# Patient Record
Sex: FEMALE
[De-identification: names, ages, dates, MRNs, and addresses within clinical notes are randomized per-mention and may not be internally consistent; named-entity substitution may affect disease eponyms.]

---

## 2024-06-03 ENCOUNTER — APPOINTMENT (OUTPATIENT)
Dept: ANTEPARTUM | Facility: CLINIC | Age: 24
End: 2024-06-03
Payer: COMMERCIAL

## 2024-06-03 ENCOUNTER — ASOB RESULT (OUTPATIENT)
Age: 24
End: 2024-06-03

## 2024-06-03 ENCOUNTER — LABORATORY RESULT (OUTPATIENT)
Age: 24
End: 2024-06-03

## 2024-06-03 PROBLEM — Z00.00 ENCOUNTER FOR PREVENTIVE HEALTH EXAMINATION: Status: ACTIVE | Noted: 2024-06-03

## 2024-06-03 PROCEDURE — 76813 OB US NUCHAL MEAS 1 GEST: CPT

## 2024-06-03 PROCEDURE — 36416 COLLJ CAPILLARY BLOOD SPEC: CPT

## 2024-06-03 PROCEDURE — 76801 OB US < 14 WKS SINGLE FETUS: CPT

## 2024-06-04 ENCOUNTER — APPOINTMENT (OUTPATIENT)
Dept: MATERNAL FETAL MEDICINE | Facility: CLINIC | Age: 24
End: 2024-06-04

## 2024-06-12 ENCOUNTER — ASOB RESULT (OUTPATIENT)
Age: 24
End: 2024-06-12

## 2024-06-12 ENCOUNTER — APPOINTMENT (OUTPATIENT)
Dept: MATERNAL FETAL MEDICINE | Facility: CLINIC | Age: 24
End: 2024-06-12
Payer: COMMERCIAL

## 2024-06-12 DIAGNOSIS — O24.919 UNSPECIFIED DIABETES MELLITUS IN PREGNANCY, UNSPECIFIED TRIMESTER: ICD-10-CM

## 2024-06-12 PROCEDURE — G0108 DIAB MANAGE TRN  PER INDIV: CPT | Mod: 95

## 2024-06-12 RX ORDER — BLOOD-GLUCOSE SENSOR
EACH MISCELLANEOUS
Qty: 2 | Refills: 3 | Status: ACTIVE | COMMUNITY
Start: 2024-06-12 | End: 1900-01-01

## 2024-06-19 ENCOUNTER — ASOB RESULT (OUTPATIENT)
Age: 24
End: 2024-06-19

## 2024-06-19 ENCOUNTER — TRANSCRIPTION ENCOUNTER (OUTPATIENT)
Age: 24
End: 2024-06-19

## 2024-06-19 ENCOUNTER — APPOINTMENT (OUTPATIENT)
Dept: MATERNAL FETAL MEDICINE | Facility: CLINIC | Age: 24
End: 2024-06-19
Payer: COMMERCIAL

## 2024-06-19 PROCEDURE — G0108 DIAB MANAGE TRN  PER INDIV: CPT | Mod: 95

## 2024-06-19 RX ORDER — PEN NEEDLE, DIABETIC 32GX 5/32"
32G X 4 MM NEEDLE, DISPOSABLE MISCELLANEOUS
Qty: 1 | Refills: 2 | Status: ACTIVE | COMMUNITY
Start: 2024-06-19 | End: 1900-01-01

## 2024-06-19 RX ORDER — ISOPROPYL ALCOHOL 0.7 ML/ML
SWAB TOPICAL
Qty: 1 | Refills: 2 | Status: ACTIVE | COMMUNITY
Start: 2024-06-19 | End: 1900-01-01

## 2024-06-19 RX ORDER — INSULIN GLARGINE 100 [IU]/ML
100 INJECTION, SOLUTION SUBCUTANEOUS
Qty: 1 | Refills: 2 | Status: ACTIVE | COMMUNITY
Start: 2024-06-19 | End: 1900-01-01

## 2024-06-20 ENCOUNTER — NON-APPOINTMENT (OUTPATIENT)
Age: 24
End: 2024-06-20

## 2024-06-21 ENCOUNTER — NON-APPOINTMENT (OUTPATIENT)
Age: 24
End: 2024-06-21

## 2024-06-24 ENCOUNTER — APPOINTMENT (OUTPATIENT)
Dept: ANTEPARTUM | Facility: CLINIC | Age: 24
End: 2024-06-24

## 2024-06-24 ENCOUNTER — ASOB RESULT (OUTPATIENT)
Age: 24
End: 2024-06-24

## 2024-06-24 ENCOUNTER — APPOINTMENT (OUTPATIENT)
Dept: MATERNAL FETAL MEDICINE | Facility: CLINIC | Age: 24
End: 2024-06-24
Payer: COMMERCIAL

## 2024-06-24 VITALS
SYSTOLIC BLOOD PRESSURE: 119 MMHG | HEART RATE: 97 BPM | DIASTOLIC BLOOD PRESSURE: 76 MMHG | HEIGHT: 66 IN | OXYGEN SATURATION: 98 % | WEIGHT: 256.13 LBS | BODY MASS INDEX: 41.16 KG/M2

## 2024-06-24 PROCEDURE — 76817 TRANSVAGINAL US OBSTETRIC: CPT

## 2024-06-24 PROCEDURE — 76805 OB US >/= 14 WKS SNGL FETUS: CPT | Mod: 59

## 2024-06-24 PROCEDURE — 99214 OFFICE O/P EST MOD 30 MIN: CPT | Mod: 25

## 2024-06-26 LAB
ALBUMIN SERPL ELPH-MCNC: 4.2 G/DL
ALP BLD-CCNC: 66 U/L
ALT SERPL-CCNC: 12 U/L
ANION GAP SERPL CALC-SCNC: 17 MMOL/L
AST SERPL-CCNC: 14 U/L
BASOPHILS # BLD AUTO: 0.03 K/UL
BASOPHILS NFR BLD AUTO: 0.3 %
BILIRUB SERPL-MCNC: 0.4 MG/DL
BUN SERPL-MCNC: 7 MG/DL
CALCIUM SERPL-MCNC: 9.8 MG/DL
CHLORIDE SERPL-SCNC: 97 MMOL/L
CO2 SERPL-SCNC: 22 MMOL/L
CREAT SERPL-MCNC: 0.51 MG/DL
CREAT SPEC-SCNC: 205 MG/DL
CREAT/PROT UR: 0.1 RATIO
EGFR: 135 ML/MIN/1.73M2
EOSINOPHIL # BLD AUTO: 0.08 K/UL
EOSINOPHIL NFR BLD AUTO: 0.9 %
GLUCOSE SERPL-MCNC: 70 MG/DL
HCT VFR BLD CALC: 36.4 %
HGB BLD-MCNC: 11.6 G/DL
IMM GRANULOCYTES NFR BLD AUTO: 0.9 %
LYMPHOCYTES # BLD AUTO: 2.9 K/UL
LYMPHOCYTES NFR BLD AUTO: 32.2 %
MAN DIFF?: NORMAL
MCHC RBC-ENTMCNC: 28.9 PG
MCHC RBC-ENTMCNC: 31.9 GM/DL
MCV RBC AUTO: 90.5 FL
MONOCYTES # BLD AUTO: 0.63 K/UL
MONOCYTES NFR BLD AUTO: 7 %
NEUTROPHILS # BLD AUTO: 5.3 K/UL
NEUTROPHILS NFR BLD AUTO: 58.7 %
PLATELET # BLD AUTO: 309 K/UL
POTASSIUM SERPL-SCNC: 3.8 MMOL/L
PROT SERPL-MCNC: 7.5 G/DL
PROT UR-MCNC: 14 MG/DL
RBC # BLD: 4.02 M/UL
RBC # FLD: 13.5 %
SODIUM SERPL-SCNC: 137 MMOL/L
TSH SERPL-ACNC: 2.7 UIU/ML
WBC # FLD AUTO: 9.02 K/UL

## 2024-06-27 ENCOUNTER — APPOINTMENT (OUTPATIENT)
Dept: MATERNAL FETAL MEDICINE | Facility: CLINIC | Age: 24
End: 2024-06-27
Payer: COMMERCIAL

## 2024-06-27 ENCOUNTER — ASOB RESULT (OUTPATIENT)
Age: 24
End: 2024-06-27

## 2024-06-27 PROCEDURE — G0108 DIAB MANAGE TRN  PER INDIV: CPT | Mod: 95

## 2024-07-02 DIAGNOSIS — E66.9 OBESITY, UNSPECIFIED: ICD-10-CM

## 2024-07-02 DIAGNOSIS — O09.90 SUPERVISION OF HIGH RISK PREGNANCY, UNSPECIFIED, UNSPECIFIED TRIMESTER: ICD-10-CM

## 2024-07-05 ENCOUNTER — ASOB RESULT (OUTPATIENT)
Age: 24
End: 2024-07-05

## 2024-07-05 ENCOUNTER — APPOINTMENT (OUTPATIENT)
Dept: MATERNAL FETAL MEDICINE | Facility: CLINIC | Age: 24
End: 2024-07-05
Payer: COMMERCIAL

## 2024-07-05 PROCEDURE — G0108 DIAB MANAGE TRN  PER INDIV: CPT | Mod: 95

## 2024-07-09 ENCOUNTER — NON-APPOINTMENT (OUTPATIENT)
Age: 24
End: 2024-07-09

## 2024-07-10 ENCOUNTER — APPOINTMENT (OUTPATIENT)
Dept: MATERNAL FETAL MEDICINE | Facility: CLINIC | Age: 24
End: 2024-07-10
Payer: COMMERCIAL

## 2024-07-10 ENCOUNTER — ASOB RESULT (OUTPATIENT)
Age: 24
End: 2024-07-10

## 2024-07-10 PROCEDURE — G0108 DIAB MANAGE TRN  PER INDIV: CPT | Mod: 95

## 2024-07-12 ENCOUNTER — APPOINTMENT (OUTPATIENT)
Dept: ANTEPARTUM | Facility: CLINIC | Age: 24
End: 2024-07-12
Payer: COMMERCIAL

## 2024-07-12 ENCOUNTER — APPOINTMENT (OUTPATIENT)
Dept: MATERNAL FETAL MEDICINE | Facility: CLINIC | Age: 24
End: 2024-07-12

## 2024-07-12 ENCOUNTER — ASOB RESULT (OUTPATIENT)
Age: 24
End: 2024-07-12

## 2024-07-12 PROCEDURE — 76815 OB US LIMITED FETUS(S): CPT

## 2024-07-12 PROCEDURE — 76817 TRANSVAGINAL US OBSTETRIC: CPT

## 2024-07-17 ENCOUNTER — APPOINTMENT (OUTPATIENT)
Dept: MATERNAL FETAL MEDICINE | Facility: CLINIC | Age: 24
End: 2024-07-17
Payer: COMMERCIAL

## 2024-07-17 ENCOUNTER — ASOB RESULT (OUTPATIENT)
Age: 24
End: 2024-07-17

## 2024-07-17 PROCEDURE — G0108 DIAB MANAGE TRN  PER INDIV: CPT | Mod: 95

## 2024-07-22 ENCOUNTER — APPOINTMENT (OUTPATIENT)
Dept: ANTEPARTUM | Facility: CLINIC | Age: 24
End: 2024-07-22

## 2024-07-22 ENCOUNTER — ASOB RESULT (OUTPATIENT)
Age: 24
End: 2024-07-22

## 2024-07-22 ENCOUNTER — APPOINTMENT (OUTPATIENT)
Dept: MATERNAL FETAL MEDICINE | Facility: CLINIC | Age: 24
End: 2024-07-22
Payer: COMMERCIAL

## 2024-07-22 VITALS
DIASTOLIC BLOOD PRESSURE: 78 MMHG | HEIGHT: 66 IN | OXYGEN SATURATION: 98 % | WEIGHT: 264 LBS | HEART RATE: 103 BPM | BODY MASS INDEX: 42.43 KG/M2 | SYSTOLIC BLOOD PRESSURE: 120 MMHG

## 2024-07-22 PROCEDURE — 76811 OB US DETAILED SNGL FETUS: CPT | Mod: 59

## 2024-07-22 PROCEDURE — 76817 TRANSVAGINAL US OBSTETRIC: CPT

## 2024-07-22 PROCEDURE — 99214 OFFICE O/P EST MOD 30 MIN: CPT | Mod: 25

## 2024-07-25 ENCOUNTER — ASOB RESULT (OUTPATIENT)
Age: 24
End: 2024-07-25

## 2024-07-25 ENCOUNTER — APPOINTMENT (OUTPATIENT)
Dept: MATERNAL FETAL MEDICINE | Facility: CLINIC | Age: 24
End: 2024-07-25

## 2024-07-25 PROCEDURE — G0108 DIAB MANAGE TRN  PER INDIV: CPT | Mod: 95

## 2024-08-02 ENCOUNTER — ASOB RESULT (OUTPATIENT)
Age: 24
End: 2024-08-02

## 2024-08-02 ENCOUNTER — APPOINTMENT (OUTPATIENT)
Dept: MATERNAL FETAL MEDICINE | Facility: CLINIC | Age: 24
End: 2024-08-02
Payer: COMMERCIAL

## 2024-08-02 PROCEDURE — G0108 DIAB MANAGE TRN  PER INDIV: CPT | Mod: 95

## 2024-08-06 ENCOUNTER — NON-APPOINTMENT (OUTPATIENT)
Age: 24
End: 2024-08-06

## 2024-08-06 ENCOUNTER — APPOINTMENT (OUTPATIENT)
Dept: CARDIOLOGY | Facility: CLINIC | Age: 24
End: 2024-08-06

## 2024-08-06 PROBLEM — R00.0 SINUS TACHYCARDIA: Status: ACTIVE | Noted: 2024-08-06

## 2024-08-06 PROBLEM — Z83.3 FAMILY HISTORY OF TYPE 2 DIABETES MELLITUS: Status: ACTIVE | Noted: 2024-08-06

## 2024-08-06 PROBLEM — E11.9 CONTROLLED TYPE 2 DIABETES MELLITUS WITHOUT COMPLICATION, WITHOUT LONG-TERM CURRENT USE OF INSULIN: Status: RESOLVED | Noted: 2024-08-06 | Resolved: 2024-08-06

## 2024-08-06 PROBLEM — R06.09 DYSPNEA ON EXERTION: Status: ACTIVE | Noted: 2024-08-06

## 2024-08-06 PROCEDURE — 93000 ELECTROCARDIOGRAM COMPLETE: CPT

## 2024-08-06 PROCEDURE — 99205 OFFICE O/P NEW HI 60 MIN: CPT | Mod: 25

## 2024-08-06 NOTE — PHYSICAL EXAM

## 2024-08-06 NOTE — PHYSICAL EXAM

## 2024-08-06 NOTE — DISCUSSION/SUMMARY
[EKG obtained to assist in diagnosis and management of assessed problem(s)] : EKG obtained to assist in diagnosis and management of assessed problem(s) [FreeTextEntry1] : Ms. RIAN GUTIERRES 23 year - old F  currently GA 22 weeks ( EDC 12/10/24) carries a PMH of T2DM since age 20, obesity ( BMI 42)  is here Aug 06, 2024 to establish care in the [facility] program.   # T2DM : Encouraged patient to continue healthy exercise and eating habits, focusing on a Mediterranean style of eating and aiming for the recommended 150 minutes per week of moderate physical activity.  # BP Stable Encouraged Patient to monitor BP at home, keep a log, and report results back to us for evaluation. Additionally, encouraged a heart-healthy diet and exercise as tolerated. EKG with no acute changes.  Continue Asa 81 mg x 2 tabs Daily   # SInus Tachycardia; Resting  bpm  Continue optimal hydration  limit caffeinated beverages  Echocardiogram to assess the structural and valvular function. Patient advised to monitor HR and notify if > 150 bpm.  Avoid sodas  # Echocardiogram to assess the structural and valvular function. F/u 8- 10 weeks

## 2024-08-06 NOTE — PHYSICAL EXAM

## 2024-08-06 NOTE — PHYSICAL EXAM

## 2024-08-08 ENCOUNTER — APPOINTMENT (OUTPATIENT)
Dept: PEDIATRIC CARDIOLOGY | Facility: CLINIC | Age: 24
End: 2024-08-08

## 2024-08-08 PROCEDURE — 76825 ECHO EXAM OF FETAL HEART: CPT

## 2024-08-08 PROCEDURE — 76827 ECHO EXAM OF FETAL HEART: CPT

## 2024-08-08 PROCEDURE — 76821 MIDDLE CEREBRAL ARTERY ECHO: CPT | Mod: 26

## 2024-08-08 PROCEDURE — 76820 UMBILICAL ARTERY ECHO: CPT | Mod: 26

## 2024-08-08 PROCEDURE — 99202 OFFICE O/P NEW SF 15 MIN: CPT

## 2024-08-08 NOTE — HISTORY OF PRESENT ILLNESS
[FreeTextEntry1] : Ms. RIAN GUTIERRES 23 year - old F is here Aug 06, 2024 to establish care in the Women[facility] heart [facility] program. Appendicitis

## 2024-08-08 NOTE — HISTORY OF PRESENT ILLNESS
[FreeTextEntry1] : Ms. RIAN GUTIERRES 23 year - old F is here Aug 06, 2024 to establish care in the [facility] program.

## 2024-08-09 ENCOUNTER — NON-APPOINTMENT (OUTPATIENT)
Age: 24
End: 2024-08-09

## 2024-08-12 ENCOUNTER — APPOINTMENT (OUTPATIENT)
Dept: MATERNAL FETAL MEDICINE | Facility: CLINIC | Age: 24
End: 2024-08-12
Payer: COMMERCIAL

## 2024-08-12 ENCOUNTER — ASOB RESULT (OUTPATIENT)
Age: 24
End: 2024-08-12

## 2024-08-12 PROCEDURE — G0108 DIAB MANAGE TRN  PER INDIV: CPT | Mod: 95

## 2024-08-12 RX ORDER — BLOOD SUGAR DIAGNOSTIC
STRIP MISCELLANEOUS
Qty: 6 | Refills: 0 | Status: ACTIVE | COMMUNITY
Start: 2024-08-12 | End: 1900-01-01

## 2024-08-19 ENCOUNTER — APPOINTMENT (OUTPATIENT)
Dept: MATERNAL FETAL MEDICINE | Facility: CLINIC | Age: 24
End: 2024-08-19
Payer: COMMERCIAL

## 2024-08-19 ENCOUNTER — ASOB RESULT (OUTPATIENT)
Age: 24
End: 2024-08-19

## 2024-08-19 PROCEDURE — G0108 DIAB MANAGE TRN  PER INDIV: CPT | Mod: 95

## 2024-08-22 ENCOUNTER — ASOB RESULT (OUTPATIENT)
Age: 24
End: 2024-08-22

## 2024-08-22 ENCOUNTER — APPOINTMENT (OUTPATIENT)
Dept: ANTEPARTUM | Facility: CLINIC | Age: 24
End: 2024-08-22

## 2024-08-22 ENCOUNTER — APPOINTMENT (OUTPATIENT)
Dept: MATERNAL FETAL MEDICINE | Facility: CLINIC | Age: 24
End: 2024-08-22

## 2024-08-22 PROCEDURE — 76816 OB US FOLLOW-UP PER FETUS: CPT

## 2024-08-23 ENCOUNTER — APPOINTMENT (OUTPATIENT)
Dept: ANTEPARTUM | Facility: CLINIC | Age: 24
End: 2024-08-23

## 2024-08-28 ENCOUNTER — ASOB RESULT (OUTPATIENT)
Age: 24
End: 2024-08-28

## 2024-08-28 ENCOUNTER — APPOINTMENT (OUTPATIENT)
Dept: MATERNAL FETAL MEDICINE | Facility: CLINIC | Age: 24
End: 2024-08-28

## 2024-08-28 PROCEDURE — G0108 DIAB MANAGE TRN  PER INDIV: CPT | Mod: 95

## 2024-09-04 ENCOUNTER — APPOINTMENT (OUTPATIENT)
Dept: MATERNAL FETAL MEDICINE | Facility: CLINIC | Age: 24
End: 2024-09-04
Payer: COMMERCIAL

## 2024-09-04 ENCOUNTER — ASOB RESULT (OUTPATIENT)
Age: 24
End: 2024-09-04

## 2024-09-04 PROCEDURE — G0108 DIAB MANAGE TRN  PER INDIV: CPT | Mod: 95

## 2024-09-09 ENCOUNTER — NON-APPOINTMENT (OUTPATIENT)
Age: 24
End: 2024-09-09

## 2024-09-13 ENCOUNTER — ASOB RESULT (OUTPATIENT)
Age: 24
End: 2024-09-13

## 2024-09-13 ENCOUNTER — APPOINTMENT (OUTPATIENT)
Dept: MATERNAL FETAL MEDICINE | Facility: CLINIC | Age: 24
End: 2024-09-13
Payer: COMMERCIAL

## 2024-09-13 PROCEDURE — G0108 DIAB MANAGE TRN  PER INDIV: CPT | Mod: 95

## 2024-09-19 ENCOUNTER — APPOINTMENT (OUTPATIENT)
Dept: MATERNAL FETAL MEDICINE | Facility: CLINIC | Age: 24
End: 2024-09-19
Payer: COMMERCIAL

## 2024-09-19 ENCOUNTER — ASOB RESULT (OUTPATIENT)
Age: 24
End: 2024-09-19

## 2024-09-19 ENCOUNTER — APPOINTMENT (OUTPATIENT)
Dept: ANTEPARTUM | Facility: CLINIC | Age: 24
End: 2024-09-19
Payer: COMMERCIAL

## 2024-09-19 VITALS
HEART RATE: 121 BPM | DIASTOLIC BLOOD PRESSURE: 76 MMHG | HEIGHT: 66 IN | BODY MASS INDEX: 43.71 KG/M2 | WEIGHT: 272 LBS | OXYGEN SATURATION: 98 % | SYSTOLIC BLOOD PRESSURE: 120 MMHG

## 2024-09-19 DIAGNOSIS — O24.919 UNSPECIFIED DIABETES MELLITUS IN PREGNANCY, UNSPECIFIED TRIMESTER: ICD-10-CM

## 2024-09-19 DIAGNOSIS — E66.9 OBESITY, UNSPECIFIED: ICD-10-CM

## 2024-09-19 PROCEDURE — 76819 FETAL BIOPHYS PROFIL W/O NST: CPT

## 2024-09-19 PROCEDURE — 99214 OFFICE O/P EST MOD 30 MIN: CPT | Mod: 25

## 2024-09-19 PROCEDURE — 76816 OB US FOLLOW-UP PER FETUS: CPT

## 2024-09-20 ENCOUNTER — APPOINTMENT (OUTPATIENT)
Dept: ANTEPARTUM | Facility: CLINIC | Age: 24
End: 2024-09-20

## 2024-09-20 ENCOUNTER — APPOINTMENT (OUTPATIENT)
Dept: MATERNAL FETAL MEDICINE | Facility: CLINIC | Age: 24
End: 2024-09-20

## 2024-09-20 ENCOUNTER — ASOB RESULT (OUTPATIENT)
Age: 24
End: 2024-09-20

## 2024-09-20 LAB
ALBUMIN SERPL ELPH-MCNC: 4.1 G/DL
ALP BLD-CCNC: 89 U/L
ALT SERPL-CCNC: 13 U/L
ANION GAP SERPL CALC-SCNC: 17 MMOL/L
AST SERPL-CCNC: 18 U/L
BILIRUB SERPL-MCNC: 0.4 MG/DL
BUN SERPL-MCNC: 6 MG/DL
CALCIUM SERPL-MCNC: 9.5 MG/DL
CHLORIDE SERPL-SCNC: 97 MMOL/L
CO2 SERPL-SCNC: 23 MMOL/L
CREAT SERPL-MCNC: 0.47 MG/DL
EGFR: 137 ML/MIN/1.73M2
GLUCOSE SERPL-MCNC: 63 MG/DL
HCT VFR BLD CALC: 33.2 %
HGB BLD-MCNC: 10.5 G/DL
LDH SERPL-CCNC: 143 U/L
MCHC RBC-ENTMCNC: 29 PG
MCHC RBC-ENTMCNC: 31.6 GM/DL
MCV RBC AUTO: 91.7 FL
PLATELET # BLD AUTO: 217 K/UL
POTASSIUM SERPL-SCNC: 3.8 MMOL/L
PROT SERPL-MCNC: 7.2 G/DL
RBC # BLD: 3.62 M/UL
RBC # FLD: 13.8 %
SODIUM SERPL-SCNC: 137 MMOL/L
WBC # FLD AUTO: 7.29 K/UL

## 2024-09-20 PROCEDURE — G0108 DIAB MANAGE TRN  PER INDIV: CPT | Mod: 95

## 2024-09-24 LAB
CREAT SPEC-SCNC: 75 MG/DL
CREAT/PROT UR: 0.2 RATIO
PROT UR-MCNC: 11 MG/DL

## 2024-09-26 ENCOUNTER — APPOINTMENT (OUTPATIENT)
Dept: CARDIOLOGY | Facility: CLINIC | Age: 24
End: 2024-09-26
Payer: COMMERCIAL

## 2024-09-26 PROCEDURE — 93306 TTE W/DOPPLER COMPLETE: CPT

## 2024-09-27 ENCOUNTER — ASOB RESULT (OUTPATIENT)
Age: 24
End: 2024-09-27

## 2024-09-27 ENCOUNTER — APPOINTMENT (OUTPATIENT)
Dept: MATERNAL FETAL MEDICINE | Facility: CLINIC | Age: 24
End: 2024-09-27
Payer: COMMERCIAL

## 2024-09-27 PROCEDURE — G0108 DIAB MANAGE TRN  PER INDIV: CPT | Mod: 95

## 2024-09-30 ENCOUNTER — APPOINTMENT (OUTPATIENT)
Dept: CARDIOLOGY | Facility: CLINIC | Age: 24
End: 2024-09-30
Payer: COMMERCIAL

## 2024-09-30 ENCOUNTER — NON-APPOINTMENT (OUTPATIENT)
Age: 24
End: 2024-09-30

## 2024-09-30 VITALS
OXYGEN SATURATION: 95 % | HEART RATE: 133 BPM | SYSTOLIC BLOOD PRESSURE: 101 MMHG | WEIGHT: 272 LBS | HEIGHT: 66 IN | BODY MASS INDEX: 43.71 KG/M2 | DIASTOLIC BLOOD PRESSURE: 71 MMHG

## 2024-09-30 DIAGNOSIS — R00.0 TACHYCARDIA, UNSPECIFIED: ICD-10-CM

## 2024-09-30 PROCEDURE — 93000 ELECTROCARDIOGRAM COMPLETE: CPT

## 2024-09-30 PROCEDURE — 99213 OFFICE O/P EST LOW 20 MIN: CPT | Mod: 25

## 2024-09-30 NOTE — HISTORY OF PRESENT ILLNESS
[FreeTextEntry1] : Ms. RIAN GUTIERRES 23 year - old F  currently GA 29.6  weeks ( EDC 12/10/24) carries a PMH of T2DM since age 20, obesity ( BMI 42) is here for followup   # BP Stable  Continue Asa 81 mg x 2 tabs Daily   # SInus Tachycardia; Resting  bpm  Continue optimal hydration  Echo normal

## 2024-09-30 NOTE — PHYSICAL EXAM

## 2024-09-30 NOTE — DISCUSSION/SUMMARY
[FreeTextEntry1] : Ms. RIAN GUTIERRES 23 year - old F  currently GA 29. 6 weeks ( EDC 12/10/24) carries a PMH of T2DM since age 20, obesity ( BMI 42)  is here followup  TTE normal  FU in 6 weeks    [EKG obtained to assist in diagnosis and management of assessed problem(s)] : EKG obtained to assist in diagnosis and management of assessed problem(s)

## 2024-10-04 ENCOUNTER — ASOB RESULT (OUTPATIENT)
Age: 24
End: 2024-10-04

## 2024-10-04 ENCOUNTER — APPOINTMENT (OUTPATIENT)
Dept: MATERNAL FETAL MEDICINE | Facility: CLINIC | Age: 24
End: 2024-10-04
Payer: COMMERCIAL

## 2024-10-04 PROCEDURE — G0108 DIAB MANAGE TRN  PER INDIV: CPT | Mod: 95

## 2024-10-08 ENCOUNTER — NON-APPOINTMENT (OUTPATIENT)
Age: 24
End: 2024-10-08

## 2024-10-11 ENCOUNTER — ASOB RESULT (OUTPATIENT)
Age: 24
End: 2024-10-11

## 2024-10-11 ENCOUNTER — APPOINTMENT (OUTPATIENT)
Dept: MATERNAL FETAL MEDICINE | Facility: CLINIC | Age: 24
End: 2024-10-11
Payer: COMMERCIAL

## 2024-10-11 PROCEDURE — G0108 DIAB MANAGE TRN  PER INDIV: CPT | Mod: 95

## 2024-10-14 ENCOUNTER — ASOB RESULT (OUTPATIENT)
Age: 24
End: 2024-10-14

## 2024-10-14 ENCOUNTER — APPOINTMENT (OUTPATIENT)
Dept: ANTEPARTUM | Facility: CLINIC | Age: 24
End: 2024-10-14
Payer: COMMERCIAL

## 2024-10-14 PROCEDURE — 76819 FETAL BIOPHYS PROFIL W/O NST: CPT

## 2024-10-14 PROCEDURE — 76816 OB US FOLLOW-UP PER FETUS: CPT

## 2024-10-17 ENCOUNTER — ASOB RESULT (OUTPATIENT)
Age: 24
End: 2024-10-17

## 2024-10-17 ENCOUNTER — APPOINTMENT (OUTPATIENT)
Dept: ANTEPARTUM | Facility: CLINIC | Age: 24
End: 2024-10-17

## 2024-10-17 ENCOUNTER — APPOINTMENT (OUTPATIENT)
Dept: ANTEPARTUM | Facility: CLINIC | Age: 24
End: 2024-10-17
Payer: COMMERCIAL

## 2024-10-17 ENCOUNTER — APPOINTMENT (OUTPATIENT)
Dept: MATERNAL FETAL MEDICINE | Facility: CLINIC | Age: 24
End: 2024-10-17
Payer: COMMERCIAL

## 2024-10-17 DIAGNOSIS — O24.119 PRE-EXISTING TYPE 2 DIABETES MELLITUS, TYPE 2, IN PREGNANCY, UNSPECIFIED TRIMESTER: ICD-10-CM

## 2024-10-17 PROCEDURE — 99214 OFFICE O/P EST MOD 30 MIN: CPT | Mod: 25

## 2024-10-17 PROCEDURE — 76818 FETAL BIOPHYS PROFILE W/NST: CPT

## 2024-10-18 ENCOUNTER — APPOINTMENT (OUTPATIENT)
Dept: MATERNAL FETAL MEDICINE | Facility: CLINIC | Age: 24
End: 2024-10-18

## 2024-10-18 ENCOUNTER — ASOB RESULT (OUTPATIENT)
Age: 24
End: 2024-10-18

## 2024-10-18 PROCEDURE — G0108 DIAB MANAGE TRN  PER INDIV: CPT | Mod: 95

## 2024-10-21 ENCOUNTER — NON-APPOINTMENT (OUTPATIENT)
Age: 24
End: 2024-10-21

## 2024-10-21 ENCOUNTER — ASOB RESULT (OUTPATIENT)
Age: 24
End: 2024-10-21

## 2024-10-21 ENCOUNTER — APPOINTMENT (OUTPATIENT)
Dept: ANTEPARTUM | Facility: CLINIC | Age: 24
End: 2024-10-21
Payer: COMMERCIAL

## 2024-10-21 PROCEDURE — 76818 FETAL BIOPHYS PROFILE W/NST: CPT

## 2024-10-24 ENCOUNTER — APPOINTMENT (OUTPATIENT)
Dept: ANTEPARTUM | Facility: CLINIC | Age: 24
End: 2024-10-24

## 2024-10-28 ENCOUNTER — APPOINTMENT (OUTPATIENT)
Dept: MATERNAL FETAL MEDICINE | Facility: CLINIC | Age: 24
End: 2024-10-28
Payer: COMMERCIAL

## 2024-10-28 ENCOUNTER — ASOB RESULT (OUTPATIENT)
Age: 24
End: 2024-10-28

## 2024-10-28 PROCEDURE — G0108 DIAB MANAGE TRN  PER INDIV: CPT | Mod: 95

## 2024-10-29 ENCOUNTER — APPOINTMENT (OUTPATIENT)
Dept: ANTEPARTUM | Facility: CLINIC | Age: 24
End: 2024-10-29

## 2024-11-01 ENCOUNTER — APPOINTMENT (OUTPATIENT)
Dept: ANTEPARTUM | Facility: CLINIC | Age: 24
End: 2024-11-01
Payer: COMMERCIAL

## 2024-11-01 ENCOUNTER — ASOB RESULT (OUTPATIENT)
Age: 24
End: 2024-11-01

## 2024-11-01 PROCEDURE — 76818 FETAL BIOPHYS PROFILE W/NST: CPT

## 2024-11-04 ENCOUNTER — ASOB RESULT (OUTPATIENT)
Age: 24
End: 2024-11-04

## 2024-11-04 ENCOUNTER — APPOINTMENT (OUTPATIENT)
Dept: MATERNAL FETAL MEDICINE | Facility: CLINIC | Age: 24
End: 2024-11-04
Payer: COMMERCIAL

## 2024-11-04 ENCOUNTER — APPOINTMENT (OUTPATIENT)
Dept: ANTEPARTUM | Facility: CLINIC | Age: 24
End: 2024-11-04
Payer: COMMERCIAL

## 2024-11-04 PROCEDURE — 76818 FETAL BIOPHYS PROFILE W/NST: CPT

## 2024-11-04 PROCEDURE — G0108 DIAB MANAGE TRN  PER INDIV: CPT | Mod: 95

## 2024-11-05 ENCOUNTER — NON-APPOINTMENT (OUTPATIENT)
Age: 24
End: 2024-11-05

## 2024-11-08 ENCOUNTER — APPOINTMENT (OUTPATIENT)
Dept: ANTEPARTUM | Facility: CLINIC | Age: 24
End: 2024-11-08

## 2024-11-11 ENCOUNTER — ASOB RESULT (OUTPATIENT)
Age: 24
End: 2024-11-11

## 2024-11-11 ENCOUNTER — APPOINTMENT (OUTPATIENT)
Dept: MATERNAL FETAL MEDICINE | Facility: CLINIC | Age: 24
End: 2024-11-11
Payer: COMMERCIAL

## 2024-11-11 ENCOUNTER — APPOINTMENT (OUTPATIENT)
Dept: ANTEPARTUM | Facility: CLINIC | Age: 24
End: 2024-11-11
Payer: COMMERCIAL

## 2024-11-11 PROCEDURE — 76816 OB US FOLLOW-UP PER FETUS: CPT

## 2024-11-11 PROCEDURE — 76818 FETAL BIOPHYS PROFILE W/NST: CPT

## 2024-11-11 PROCEDURE — G0108 DIAB MANAGE TRN  PER INDIV: CPT

## 2024-11-14 ENCOUNTER — APPOINTMENT (OUTPATIENT)
Dept: MATERNAL FETAL MEDICINE | Facility: CLINIC | Age: 24
End: 2024-11-14

## 2024-11-14 ENCOUNTER — ASOB RESULT (OUTPATIENT)
Age: 24
End: 2024-11-14

## 2024-11-14 ENCOUNTER — APPOINTMENT (OUTPATIENT)
Dept: ANTEPARTUM | Facility: CLINIC | Age: 24
End: 2024-11-14
Payer: COMMERCIAL

## 2024-11-14 VITALS
HEIGHT: 66 IN | HEART RATE: 110 BPM | BODY MASS INDEX: 45 KG/M2 | OXYGEN SATURATION: 98 % | WEIGHT: 280 LBS | DIASTOLIC BLOOD PRESSURE: 88 MMHG | SYSTOLIC BLOOD PRESSURE: 129 MMHG

## 2024-11-14 DIAGNOSIS — O09.90 SUPERVISION OF HIGH RISK PREGNANCY, UNSPECIFIED, UNSPECIFIED TRIMESTER: ICD-10-CM

## 2024-11-14 PROCEDURE — 99215 OFFICE O/P EST HI 40 MIN: CPT | Mod: 25

## 2024-11-14 PROCEDURE — 59025 FETAL NON-STRESS TEST: CPT

## 2024-11-15 ENCOUNTER — OUTPATIENT (OUTPATIENT)
Dept: INPATIENT UNIT | Facility: HOSPITAL | Age: 24
LOS: 1 days | Discharge: HOME | End: 2024-11-15

## 2024-11-15 ENCOUNTER — APPOINTMENT (OUTPATIENT)
Dept: ANTEPARTUM | Facility: CLINIC | Age: 24
End: 2024-11-15
Payer: COMMERCIAL

## 2024-11-15 ENCOUNTER — ASOB RESULT (OUTPATIENT)
Age: 24
End: 2024-11-15

## 2024-11-15 VITALS
HEART RATE: 105 BPM | TEMPERATURE: 98 F | DIASTOLIC BLOOD PRESSURE: 66 MMHG | SYSTOLIC BLOOD PRESSURE: 119 MMHG | RESPIRATION RATE: 16 BRPM

## 2024-11-15 VITALS — HEART RATE: 97 BPM | DIASTOLIC BLOOD PRESSURE: 74 MMHG | SYSTOLIC BLOOD PRESSURE: 130 MMHG

## 2024-11-15 DIAGNOSIS — O26.899 OTHER SPECIFIED PREGNANCY RELATED CONDITIONS, UNSPECIFIED TRIMESTER: ICD-10-CM

## 2024-11-15 LAB
ALBUMIN SERPL ELPH-MCNC: 3.7 G/DL — SIGNIFICANT CHANGE UP (ref 3.3–5)
ALBUMIN SERPL ELPH-MCNC: 3.9 G/DL
ALP BLD-CCNC: 174 U/L
ALP SERPL-CCNC: 164 U/L — HIGH (ref 40–120)
ALT FLD-CCNC: 20 U/L — SIGNIFICANT CHANGE UP (ref 4–33)
ALT SERPL-CCNC: 20 U/L
ANION GAP SERPL CALC-SCNC: 13 MMOL/L — SIGNIFICANT CHANGE UP (ref 7–14)
ANION GAP SERPL CALC-SCNC: 15 MMOL/L
APPEARANCE UR: ABNORMAL
AST SERPL-CCNC: 25 U/L
AST SERPL-CCNC: 25 U/L — SIGNIFICANT CHANGE UP (ref 4–32)
BACTERIA # UR AUTO: ABNORMAL /HPF
BASOPHILS # BLD AUTO: 0.02 K/UL — SIGNIFICANT CHANGE UP (ref 0–0.2)
BASOPHILS NFR BLD AUTO: 0.3 % — SIGNIFICANT CHANGE UP (ref 0–2)
BILIRUB SERPL-MCNC: 0.5 MG/DL
BILIRUB SERPL-MCNC: 0.5 MG/DL — SIGNIFICANT CHANGE UP (ref 0.2–1.2)
BILIRUB UR-MCNC: NEGATIVE — SIGNIFICANT CHANGE UP
BUN SERPL-MCNC: 6 MG/DL — LOW (ref 7–23)
BUN SERPL-MCNC: 8 MG/DL
CALCIUM SERPL-MCNC: 9.4 MG/DL
CALCIUM SERPL-MCNC: 9.4 MG/DL — SIGNIFICANT CHANGE UP (ref 8.4–10.5)
CAST: 0 /LPF — SIGNIFICANT CHANGE UP (ref 0–4)
CHLORIDE SERPL-SCNC: 103 MMOL/L
CHLORIDE SERPL-SCNC: 103 MMOL/L — SIGNIFICANT CHANGE UP (ref 98–107)
CO2 SERPL-SCNC: 21 MMOL/L — LOW (ref 22–31)
CO2 SERPL-SCNC: 22 MMOL/L
COLOR SPEC: YELLOW — SIGNIFICANT CHANGE UP
CREAT ?TM UR-MCNC: 90 MG/DL — SIGNIFICANT CHANGE UP
CREAT SERPL-MCNC: 0.42 MG/DL — LOW (ref 0.5–1.3)
CREAT SERPL-MCNC: 0.51 MG/DL
CREAT SPEC-SCNC: 125 MG/DL
CREAT/PROT UR: 0.1 RATIO
DIFF PNL FLD: NEGATIVE — SIGNIFICANT CHANGE UP
EGFR: 134 ML/MIN/1.73M2
EGFR: 141 ML/MIN/1.73M2 — SIGNIFICANT CHANGE UP
EOSINOPHIL # BLD AUTO: 0.07 K/UL — SIGNIFICANT CHANGE UP (ref 0–0.5)
EOSINOPHIL NFR BLD AUTO: 1.2 % — SIGNIFICANT CHANGE UP (ref 0–6)
GLUCOSE SERPL-MCNC: 115 MG/DL — HIGH (ref 70–99)
GLUCOSE SERPL-MCNC: 81 MG/DL
GLUCOSE UR QL: NEGATIVE MG/DL — SIGNIFICANT CHANGE UP
HCT VFR BLD CALC: 30.3 % — LOW (ref 34.5–45)
HCT VFR BLD CALC: 32 %
HGB BLD-MCNC: 10.4 G/DL
HGB BLD-MCNC: 9.9 G/DL — LOW (ref 11.5–15.5)
IANC: 3.6 K/UL — SIGNIFICANT CHANGE UP (ref 1.8–7.4)
IMM GRANULOCYTES NFR BLD AUTO: 0.7 % — SIGNIFICANT CHANGE UP (ref 0–0.9)
KETONES UR-MCNC: ABNORMAL MG/DL
LDH SERPL L TO P-CCNC: 135 U/L — SIGNIFICANT CHANGE UP (ref 135–225)
LDH SERPL-CCNC: 143 U/L
LEUKOCYTE ESTERASE UR-ACNC: ABNORMAL
LYMPHOCYTES # BLD AUTO: 1.59 K/UL — SIGNIFICANT CHANGE UP (ref 1–3.3)
LYMPHOCYTES # BLD AUTO: 27.2 % — SIGNIFICANT CHANGE UP (ref 13–44)
MCHC RBC-ENTMCNC: 28.4 PG — SIGNIFICANT CHANGE UP (ref 27–34)
MCHC RBC-ENTMCNC: 28.9 PG
MCHC RBC-ENTMCNC: 32.5 G/DL
MCHC RBC-ENTMCNC: 32.7 G/DL — SIGNIFICANT CHANGE UP (ref 32–36)
MCV RBC AUTO: 87.1 FL — SIGNIFICANT CHANGE UP (ref 80–100)
MCV RBC AUTO: 88.9 FL
MONOCYTES # BLD AUTO: 0.52 K/UL — SIGNIFICANT CHANGE UP (ref 0–0.9)
MONOCYTES NFR BLD AUTO: 8.9 % — SIGNIFICANT CHANGE UP (ref 2–14)
NEUTROPHILS # BLD AUTO: 3.6 K/UL — SIGNIFICANT CHANGE UP (ref 1.8–7.4)
NEUTROPHILS NFR BLD AUTO: 61.7 % — SIGNIFICANT CHANGE UP (ref 43–77)
NITRITE UR-MCNC: NEGATIVE — SIGNIFICANT CHANGE UP
NRBC # BLD: 0 /100 WBCS — SIGNIFICANT CHANGE UP (ref 0–0)
NRBC # FLD: 0 K/UL — SIGNIFICANT CHANGE UP (ref 0–0)
PH UR: 6 — SIGNIFICANT CHANGE UP (ref 5–8)
PLATELET # BLD AUTO: 185 K/UL
PLATELET # BLD AUTO: 186 K/UL — SIGNIFICANT CHANGE UP (ref 150–400)
POTASSIUM SERPL-MCNC: 3.7 MMOL/L — SIGNIFICANT CHANGE UP (ref 3.5–5.3)
POTASSIUM SERPL-SCNC: 3.7 MMOL/L — SIGNIFICANT CHANGE UP (ref 3.5–5.3)
POTASSIUM SERPL-SCNC: 4.1 MMOL/L
PROT ?TM UR-MCNC: 12 MG/DL — SIGNIFICANT CHANGE UP
PROT SERPL-MCNC: 6.8 G/DL
PROT SERPL-MCNC: 6.8 G/DL — SIGNIFICANT CHANGE UP (ref 6–8.3)
PROT UR-MCNC: 11 MG/DL
PROT UR-MCNC: SIGNIFICANT CHANGE UP MG/DL
PROT/CREAT UR-RTO: 0.1 RATIO — SIGNIFICANT CHANGE UP (ref 0–0.2)
RBC # BLD: 3.48 M/UL — LOW (ref 3.8–5.2)
RBC # BLD: 3.6 M/UL
RBC # FLD: 13.9 % — SIGNIFICANT CHANGE UP (ref 10.3–14.5)
RBC # FLD: 14 %
RBC CASTS # UR COMP ASSIST: 3 /HPF — SIGNIFICANT CHANGE UP (ref 0–4)
REVIEW: SIGNIFICANT CHANGE UP
SODIUM SERPL-SCNC: 137 MMOL/L — SIGNIFICANT CHANGE UP (ref 135–145)
SODIUM SERPL-SCNC: 139 MMOL/L
SP GR SPEC: 1.01 — SIGNIFICANT CHANGE UP (ref 1–1.03)
SQUAMOUS # UR AUTO: 9 /HPF — HIGH (ref 0–5)
URATE SERPL-MCNC: 5.2 MG/DL
URATE SERPL-MCNC: 5.3 MG/DL — SIGNIFICANT CHANGE UP (ref 2.5–7)
UROBILINOGEN FLD QL: 0.2 MG/DL — SIGNIFICANT CHANGE UP (ref 0.2–1)
WBC # BLD: 5.84 K/UL — SIGNIFICANT CHANGE UP (ref 3.8–10.5)
WBC # FLD AUTO: 5.61 K/UL
WBC # FLD AUTO: 5.84 K/UL — SIGNIFICANT CHANGE UP (ref 3.8–10.5)
WBC UR QL: 8 /HPF — HIGH (ref 0–5)

## 2024-11-15 PROCEDURE — 76815 OB US LIMITED FETUS(S): CPT | Mod: 26

## 2024-11-15 PROCEDURE — 76819 FETAL BIOPHYS PROFIL W/O NST: CPT | Mod: 26

## 2024-11-15 NOTE — OB RN TRIAGE NOTE - IN ACCORDANCE WITH [place] LAW, WE OFFER EVERY PATIENT A HEPATITIS C TEST. WOULD YOU LIKE TO BE TESTED TODAY?
Assessment/Plan:    Ritika was seen today for follow-up from hospital.    Diagnoses and all orders for this visit:    Iron deficiency anemia, unspecified iron deficiency anemia type  -     AMB POC HEMOGLOBIN (HGB)  -     Iron and TIBC; Future  -     CBC with Auto Differential; Future  -     ferrous sulfate (IRON 325) 325 (65 Fe) MG tablet; Take 1 tablet by mouth 2 times daily    Type 2 diabetes mellitus without complication, with long-term current use of insulin (HCC)  -     AMB POC GLUCOSE BLOOD, BY GLUCOSE MONITORING DEVICE    Encounter for administration of vaccine    Hospital discharge follow-up  -     [place] DISCHARGE MEDS RECONCILED W/ CURRENT OUTPATIENT MED LIST    Epistaxis  -     fluticasone (FLONASE) 50 MCG/ACT nasal spray; 2 sprays by Each Nostril route daily    2 week f/up  Confirm POC hgb with cbc  Start iron as in ER hgb was 9  She is on YOEL from [facility] and this is her first month taking the med  Discontinue NSAIDs at this time   No active nose bleeds at this time.     No follow-up provider specified.    CHARLES Suárez expressed understanding of this plan. An AVS was printed and given to the patient.      ----------------------------------------------------------------------    Chief Complaint   Patient presents with    Follow-Up from Hospital     ED VISIT,  nose bleed.       History of Present Illness:  Pt went to ER 5 days ago. She had been having nose bleeds (from right side) for about 7 days and then had an uncontrollable bleed for 30 min before presenting to ER. In the ER, they were able to stop the bleeding and taught her how to control this in the future  She has no recent nose bleeds. She did not have any allergy sxs or fever or any other problem at the time of the bleeding except for what sounds like a cellulitis on her abdomen (which has resolved). She was not on abx  She has not had any nose bleeding since the eR visit 5 days ago. A recheck hemoglobin on POC  Opt out

## 2024-11-15 NOTE — OB PROVIDER TRIAGE NOTE - PLAN OF CARE
Fetal and maternal status reassuring   No evidence of PEC at this time   patient diagnosed with gHTN   Dr. Wasserman at bedside educating patient on importance on glycemic control and bp monitoring   -s/s of PEC reviewed with patient   -patient to be discharged home     Labor precautions reviewed  fetal kick counts reviewed  oral hydration encouraged  PEC s/s reviewed    discharged at: Fetal and maternal status reassuring   No evidence of PEC at this time   patient meets criteria for  gHTN   Dr. Wasserman at bedside educating patient on importance on glycemic control and bp monitoring   -s/s of PEC reviewed with patient   -patient to be discharged home     Labor precautions reviewed  fetal kick counts reviewed  oral hydration encouraged  PEC s/s reviewed  patient to follow up monday with OB     discharged at: 1403

## 2024-11-15 NOTE — OB PROVIDER TRIAGE NOTE - NSHPLABSRESULTS_GEN_ALL_CORE
9.9    5.84  )-----------( 186      ( 15 Nov 2024 11:32 )             30.3     11-15    137  |  103  |  6[L]  ----------------------------<  115[H]  3.7   |  21[L]  |  0.42[L]    Ca    9.4      15 Nov 2024 11:32    TPro  6.8  /  Alb  3.7  /  TBili  0.5  /  DBili  x   /  AST  25  /  ALT  20  /  AlkPhos  164[H]  11-15    Urinalysis Basic - ( 15 Nov 2024 11:32 )    Color: Yellow / Appearance: Cloudy / S.012 / pH: x  Gluc: 115 mg/dL / Ketone: Trace mg/dL  / Bili: Negative / Urobili: 0.2 mg/dL   Blood: x / Protein: Trace mg/dL / Nitrite: Negative   Leuk Esterase: Trace / RBC: 3 /HPF / WBC 8 /HPF   Sq Epi: x / Non Sq Epi: 9 /HPF / Bacteria: Many /HPF    PCR 0.1

## 2024-11-15 NOTE — OB PROVIDER TRIAGE NOTE - NSHPPHYSICALEXAM_GEN_ALL_CORE
Vital Signs Last 24 Hrs  T(C): 36.9 (15 Nov 2024 10:58), Max: 36.9 (15 Nov 2024 10:31)  T(F): 98.42 (15 Nov 2024 10:58), Max: 98.42 (15 Nov 2024 10:58)  HR: 99 (15 Nov 2024 11:39) (99 - 105)  BP: 116/69 (15 Nov 2024 11:39) (116/69 - 126/107)  BP(mean): --  RR: 16 (15 Nov 2024 10:58) (16 - 16)  SpO2: --    Assessment reveals VSS  General: Female sitting comfortably in no apparent distress  Neuro: No facial asymmetry, no slurred speech, moves all 4 extremities  Mood: Alert and lucid, appropriate mood and affect  A&Ox3  Lungs- clear bilateral  Heart- normal rate and regular rhythm  Extremities- Warm, Dry, no edema present, good pulses   Abdomen soft, NT, gravid  Cat 1 tracing reactive, irregular contractions   Transabdominal Ultrasound- images saved ASOB Vital Signs Last 24 Hrs  T(C): 36.9 (15 Nov 2024 10:58), Max: 36.9 (15 Nov 2024 10:31)  T(F): 98.42 (15 Nov 2024 10:58), Max: 98.42 (15 Nov 2024 10:58)  HR: 99 (15 Nov 2024 11:39) (99 - 105)  BP: 116/69 (15 Nov 2024 11:39) (116/69 - 126/107)  BP(mean): --  RR: 16 (15 Nov 2024 10:58) (16 - 16)  SpO2: --    Assessment reveals VSS  General: Female sitting comfortably in no apparent distress  Neuro: No facial asymmetry, no slurred speech, moves all 4 extremities  Mood: Alert and lucid, appropriate mood and affect  A&Ox3  Lungs- clear bilateral  Heart- normal rate and regular rhythm  Extremities- Warm, Dry, no edema present, good pulses   Abdomen soft, NT, gravid  Cat 1 tracing reactive, irregular contractions   Transabdominal Ultrasound-cephalic, anterior placenta, m mode 127, td 11.46, bpp 8/8-  images saved ASOB

## 2024-11-15 NOTE — OB PROVIDER TRIAGE NOTE - ADDITIONAL INSTRUCTIONS
Return for contractions, leaking of fluid, vaginal bleeding, decreased fetal movement  Fetal kick counts reviewed  Monitor bp 3x a day   call ob office for BP >140/90, return to hospital for bp >150/90, headaches, blurry vision, epigastric pain   oral hydration encouraged  Follow up with ob on Monday 11/18

## 2024-11-15 NOTE — OB PROVIDER TRIAGE NOTE - NSOBPROVIDERNOTE_OBGYN_ALL_OB_FT
NST in progress  for BPP   BP monitoring, HELLP labs sent  will continue to monitor NST in progress  for BPP   BP monitoring, HELLP labs sent  will continue to monitor    1300: Fetal status reassuring  -HELLP WNL   -Urine culture to be sent   -awaiting to discuss plan of care NST in progress  for BPP   BP monitoring, HELLP labs sent  will continue to monitor    1300: Fetal and maternal status reassuring  -HELLP WNL   -Urine culture to be sent   -awaiting to discuss plan of care    1345: Patient Discussed with Dr. Wasserman   -BP values and labs reviewed  -patient meets criteria for gHTN.   -patient scheduled for IOL on 11/26 but as per Dr. Wasserman induction to be moved up to 37 weeks.   -Dr. Wasserman at bedside discussing plan of care with patient, all questions and concerns addressed.   -patient to be discharged home

## 2024-11-15 NOTE — OB RN TRIAGE NOTE - FALL HARM RISK - UNIVERSAL INTERVENTIONS
Bed in lowest position, wheels locked, appropriate side rails in place/Call bell, personal items and telephone in reach/Instruct patient to call for assistance before getting out of bed or chair/Non-slip footwear when patient is out of bed/[place] to call system/Physically safe environment - no spills, clutter or unnecessary equipment/Purposeful Proactive Rounding/Room/bathroom lighting operational, light cord in reach

## 2024-11-15 NOTE — OB RN TRIAGE NOTE - NS_OBACUITY_OBGYN_ALL_OB_DT
Spine appears normal, range of motion is not limited, no muscle or joint tenderness
15-Nov-2024 10:32

## 2024-11-15 NOTE — OB PROVIDER TRIAGE NOTE - HISTORY OF PRESENT ILLNESS
24 year old  at 36.3 presents to triage with elevated bp at home states her bp was 148/88, denies headaches, blurry vision, epigastric pain. States she was told to monitor her bp daily at home but denies any prior elevated bp. Denies contractions, cramping, leaking of fluid, vaginal bleeding, reports good fetal movement. Pregnancy complicated by GDMA2.   Meds: PNV, baby ASA, iron, Novolog Lunch 64units, dinner 72 units patient states she skips breakfast, Lantus 96u QHS.     PNC: AIDE  24 year old  at 36.3 presents to triage with elevated bp at home states her bp was 148/88, denies headaches, blurry vision, epigastric pain. States she was told to monitor her bp daily at home but denies any prior elevated bp. Denies contractions, cramping, leaking of fluid, vaginal bleeding, reports good fetal movement. Pregnancy complicated by GDMA2.   Meds: PNV, baby ASA, iron, Novolog Lunch 64units, dinner 72 units patient states she skips breakfast, Lantus 96u QHS. metformin 1000mg bid     PNC: WHP  24 year old  at 36.3 presents to triage with elevated bp at home states her bp was 148/88, was seen in ob office today and had a bp of 130/96, Denies headaches, blurry vision, epigastric pain. Denies contractions, cramping, leaking of fluid, vaginal bleeding, reports good fetal movement. Pregnancy complicated by GDMA2. Patient sees cardiology for elevated bp.   Meds: PNV, baby ASA, iron, Novolog Lunch 64units, dinner 72 units patient states she skips breakfast, Lantus 96u QHS. metformin 1000mg bid     PNC: WHP  24 year old  at 36.3 presents to triage with elevated bp at home states her bp was 148/88, was seen in ob office today and had a bp of 130/96 was sent to r/o PEC, Denies headaches, blurry vision, epigastric pain. Denies contractions, cramping, leaking of fluid, vaginal bleeding, reports good fetal movement. Pregnancy complicated by GDMA2. Patient states she is followed by Cardiology.   Meds: PNV, baby ASA, iron, Novolog Lunch 64units, dinner 72 units patient states she skips breakfast, Lantus 96u QHS. metformin 1000mg bid     PNC: WHP

## 2024-11-15 NOTE — OB PROVIDER TRIAGE NOTE - NSNYCREQUIREMENTS_OBGYN_ALL_OB
pt completed venofer infusion. Pt tolerated well. Ambulated off floor independently, NAD.     CLEMENTE

## 2024-11-16 LAB
CULTURE RESULTS: SIGNIFICANT CHANGE UP
SPECIMEN SOURCE: SIGNIFICANT CHANGE UP

## 2024-11-18 ENCOUNTER — APPOINTMENT (OUTPATIENT)
Dept: ANTEPARTUM | Facility: CLINIC | Age: 24
End: 2024-11-18
Payer: COMMERCIAL

## 2024-11-18 ENCOUNTER — APPOINTMENT (OUTPATIENT)
Dept: CARDIOLOGY | Facility: CLINIC | Age: 24
End: 2024-11-18
Payer: COMMERCIAL

## 2024-11-18 ENCOUNTER — NON-APPOINTMENT (OUTPATIENT)
Age: 24
End: 2024-11-18

## 2024-11-18 ENCOUNTER — ASOB RESULT (OUTPATIENT)
Age: 24
End: 2024-11-18

## 2024-11-18 ENCOUNTER — APPOINTMENT (OUTPATIENT)
Dept: MATERNAL FETAL MEDICINE | Facility: CLINIC | Age: 24
End: 2024-11-18
Payer: COMMERCIAL

## 2024-11-18 VITALS
HEART RATE: 114 BPM | SYSTOLIC BLOOD PRESSURE: 124 MMHG | HEIGHT: 66 IN | WEIGHT: 280 LBS | OXYGEN SATURATION: 97 % | BODY MASS INDEX: 45 KG/M2 | DIASTOLIC BLOOD PRESSURE: 80 MMHG

## 2024-11-18 DIAGNOSIS — R00.0 TACHYCARDIA, UNSPECIFIED: ICD-10-CM

## 2024-11-18 DIAGNOSIS — R06.09 OTHER FORMS OF DYSPNEA: ICD-10-CM

## 2024-11-18 DIAGNOSIS — Z3A.36 36 WEEKS GESTATION OF PREGNANCY: ICD-10-CM

## 2024-11-18 DIAGNOSIS — O24.414 GESTATIONAL DIABETES MELLITUS IN PREGNANCY, INSULIN CONTROLLED: ICD-10-CM

## 2024-11-18 DIAGNOSIS — O13.3 GESTATIONAL [PREGNANCY-INDUCED] HYPERTENSION WITHOUT SIGNIFICANT PROTEINURIA, THIRD TRIMESTER: ICD-10-CM

## 2024-11-18 PROBLEM — O24.419 GESTATIONAL DIABETES MELLITUS IN PREGNANCY, UNSPECIFIED CONTROL: Chronic | Status: ACTIVE | Noted: 2024-11-15

## 2024-11-18 PROCEDURE — 93000 ELECTROCARDIOGRAM COMPLETE: CPT

## 2024-11-18 PROCEDURE — 76818 FETAL BIOPHYS PROFILE W/NST: CPT

## 2024-11-18 PROCEDURE — 99214 OFFICE O/P EST MOD 30 MIN: CPT

## 2024-11-18 PROCEDURE — G2211 COMPLEX E/M VISIT ADD ON: CPT | Mod: NC

## 2024-11-18 PROCEDURE — G0108 DIAB MANAGE TRN  PER INDIV: CPT | Mod: 95

## 2024-11-20 ENCOUNTER — NON-APPOINTMENT (OUTPATIENT)
Age: 24
End: 2024-11-20

## 2024-11-21 ENCOUNTER — NON-APPOINTMENT (OUTPATIENT)
Age: 24
End: 2024-11-21

## 2024-11-21 ENCOUNTER — APPOINTMENT (OUTPATIENT)
Dept: ANTEPARTUM | Facility: CLINIC | Age: 24
End: 2024-11-21
Payer: COMMERCIAL

## 2024-11-21 ENCOUNTER — ASOB RESULT (OUTPATIENT)
Age: 24
End: 2024-11-21

## 2024-11-21 PROCEDURE — 59025 FETAL NON-STRESS TEST: CPT

## 2024-11-23 ENCOUNTER — INPATIENT (INPATIENT)
Facility: HOSPITAL | Age: 24
LOS: 4 days | Discharge: HOME | End: 2024-11-28
Attending: STUDENT IN AN ORGANIZED HEALTH CARE EDUCATION/TRAINING PROGRAM | Admitting: STUDENT IN AN ORGANIZED HEALTH CARE EDUCATION/TRAINING PROGRAM
Payer: COMMERCIAL

## 2024-11-23 VITALS
HEART RATE: 103 BPM | TEMPERATURE: 99 F | DIASTOLIC BLOOD PRESSURE: 66 MMHG | RESPIRATION RATE: 16 BRPM | SYSTOLIC BLOOD PRESSURE: 130 MMHG | HEIGHT: 66 IN | WEIGHT: 279.99 LBS

## 2024-11-23 DIAGNOSIS — O13.3 GESTATIONAL [PREGNANCY-INDUCED] HYPERTENSION WITHOUT SIGNIFICANT PROTEINURIA, THIRD TRIMESTER: ICD-10-CM

## 2024-11-23 LAB
ALBUMIN SERPL ELPH-MCNC: 3.6 G/DL — SIGNIFICANT CHANGE UP (ref 3.3–5)
ALP SERPL-CCNC: 188 U/L — HIGH (ref 40–120)
ALT FLD-CCNC: 12 U/L — SIGNIFICANT CHANGE UP (ref 4–33)
ANION GAP SERPL CALC-SCNC: 13 MMOL/L — SIGNIFICANT CHANGE UP (ref 7–14)
APPEARANCE UR: ABNORMAL
AST SERPL-CCNC: 21 U/L — SIGNIFICANT CHANGE UP (ref 4–32)
BACTERIA # UR AUTO: ABNORMAL /HPF
BASOPHILS # BLD AUTO: 0.02 K/UL — SIGNIFICANT CHANGE UP (ref 0–0.2)
BASOPHILS NFR BLD AUTO: 0.3 % — SIGNIFICANT CHANGE UP (ref 0–2)
BILIRUB SERPL-MCNC: 0.4 MG/DL — SIGNIFICANT CHANGE UP (ref 0.2–1.2)
BILIRUB UR-MCNC: NEGATIVE — SIGNIFICANT CHANGE UP
BLD GP AB SCN SERPL QL: NEGATIVE — SIGNIFICANT CHANGE UP
BUN SERPL-MCNC: 6 MG/DL — LOW (ref 7–23)
CALCIUM SERPL-MCNC: 9.1 MG/DL — SIGNIFICANT CHANGE UP (ref 8.4–10.5)
CAST: 1 /LPF — SIGNIFICANT CHANGE UP (ref 0–4)
CHLORIDE SERPL-SCNC: 104 MMOL/L — SIGNIFICANT CHANGE UP (ref 98–107)
CO2 SERPL-SCNC: 21 MMOL/L — LOW (ref 22–31)
COLOR SPEC: YELLOW — SIGNIFICANT CHANGE UP
CREAT ?TM UR-MCNC: 140 MG/DL — SIGNIFICANT CHANGE UP
CREAT SERPL-MCNC: 0.45 MG/DL — LOW (ref 0.5–1.3)
DIFF PNL FLD: NEGATIVE — SIGNIFICANT CHANGE UP
EGFR: 139 ML/MIN/1.73M2 — SIGNIFICANT CHANGE UP
EOSINOPHIL # BLD AUTO: 0.11 K/UL — SIGNIFICANT CHANGE UP (ref 0–0.5)
EOSINOPHIL NFR BLD AUTO: 1.5 % — SIGNIFICANT CHANGE UP (ref 0–6)
GLUCOSE BLDC GLUCOMTR-MCNC: 139 MG/DL — HIGH (ref 70–99)
GLUCOSE BLDC GLUCOMTR-MCNC: 92 MG/DL — SIGNIFICANT CHANGE UP (ref 70–99)
GLUCOSE SERPL-MCNC: 113 MG/DL — HIGH (ref 70–99)
GLUCOSE UR QL: NEGATIVE MG/DL — SIGNIFICANT CHANGE UP
HCT VFR BLD CALC: 30.5 % — LOW (ref 34.5–45)
HGB BLD-MCNC: 10.2 G/DL — LOW (ref 11.5–15.5)
IANC: 4.69 K/UL — SIGNIFICANT CHANGE UP (ref 1.8–7.4)
IMM GRANULOCYTES NFR BLD AUTO: 0.8 % — SIGNIFICANT CHANGE UP (ref 0–0.9)
KETONES UR-MCNC: 40 MG/DL
LDH SERPL L TO P-CCNC: 176 U/L — SIGNIFICANT CHANGE UP (ref 135–225)
LEUKOCYTE ESTERASE UR-ACNC: ABNORMAL
LYMPHOCYTES # BLD AUTO: 1.84 K/UL — SIGNIFICANT CHANGE UP (ref 1–3.3)
LYMPHOCYTES # BLD AUTO: 25.2 % — SIGNIFICANT CHANGE UP (ref 13–44)
MCHC RBC-ENTMCNC: 28.9 PG — SIGNIFICANT CHANGE UP (ref 27–34)
MCHC RBC-ENTMCNC: 33.4 G/DL — SIGNIFICANT CHANGE UP (ref 32–36)
MCV RBC AUTO: 86.4 FL — SIGNIFICANT CHANGE UP (ref 80–100)
MONOCYTES # BLD AUTO: 0.59 K/UL — SIGNIFICANT CHANGE UP (ref 0–0.9)
MONOCYTES NFR BLD AUTO: 8.1 % — SIGNIFICANT CHANGE UP (ref 2–14)
NEUTROPHILS # BLD AUTO: 4.69 K/UL — SIGNIFICANT CHANGE UP (ref 1.8–7.4)
NEUTROPHILS NFR BLD AUTO: 64.1 % — SIGNIFICANT CHANGE UP (ref 43–77)
NITRITE UR-MCNC: NEGATIVE — SIGNIFICANT CHANGE UP
NRBC # BLD: 0 /100 WBCS — SIGNIFICANT CHANGE UP (ref 0–0)
NRBC # FLD: 0 K/UL — SIGNIFICANT CHANGE UP (ref 0–0)
PH UR: 6 — SIGNIFICANT CHANGE UP (ref 5–8)
PLATELET # BLD AUTO: 216 K/UL — SIGNIFICANT CHANGE UP (ref 150–400)
POTASSIUM SERPL-MCNC: 3.4 MMOL/L — LOW (ref 3.5–5.3)
POTASSIUM SERPL-SCNC: 3.4 MMOL/L — LOW (ref 3.5–5.3)
PROT ?TM UR-MCNC: 10 MG/DL — SIGNIFICANT CHANGE UP
PROT SERPL-MCNC: 6.8 G/DL — SIGNIFICANT CHANGE UP (ref 6–8.3)
PROT UR-MCNC: SIGNIFICANT CHANGE UP MG/DL
PROT/CREAT UR-RTO: 0.1 RATIO — SIGNIFICANT CHANGE UP (ref 0–0.2)
RBC # BLD: 3.53 M/UL — LOW (ref 3.8–5.2)
RBC # FLD: 14.1 % — SIGNIFICANT CHANGE UP (ref 10.3–14.5)
RBC CASTS # UR COMP ASSIST: 1 /HPF — SIGNIFICANT CHANGE UP (ref 0–4)
REVIEW: SIGNIFICANT CHANGE UP
RH IG SCN BLD-IMP: POSITIVE — SIGNIFICANT CHANGE UP
RH IG SCN BLD-IMP: POSITIVE — SIGNIFICANT CHANGE UP
SODIUM SERPL-SCNC: 138 MMOL/L — SIGNIFICANT CHANGE UP (ref 135–145)
SP GR SPEC: 1.02 — SIGNIFICANT CHANGE UP (ref 1–1.03)
SQUAMOUS # UR AUTO: 8 /HPF — HIGH (ref 0–5)
URATE SERPL-MCNC: 4.4 MG/DL — SIGNIFICANT CHANGE UP (ref 2.5–7)
UROBILINOGEN FLD QL: 1 MG/DL — SIGNIFICANT CHANGE UP (ref 0.2–1)
WBC # BLD: 7.31 K/UL — SIGNIFICANT CHANGE UP (ref 3.8–10.5)
WBC # FLD AUTO: 7.31 K/UL — SIGNIFICANT CHANGE UP (ref 3.8–10.5)
WBC UR QL: 9 /HPF — HIGH (ref 0–5)

## 2024-11-23 RX ORDER — 0.9 % SODIUM CHLORIDE 0.9 %
1000 INTRAVENOUS SOLUTION INTRAVENOUS
Refills: 0 | Status: DISCONTINUED | OUTPATIENT
Start: 2024-11-23 | End: 2024-11-24

## 2024-11-23 RX ORDER — SODIUM CHLORIDE 9 MG/ML
1000 INJECTION, SOLUTION INTRAMUSCULAR; INTRAVENOUS; SUBCUTANEOUS
Refills: 0 | Status: DISCONTINUED | OUTPATIENT
Start: 2024-11-23 | End: 2024-11-24

## 2024-11-23 RX ORDER — TRISODIUM CITRATE DIHYDRATE AND CITRIC ACID MONOHYDRATE 500; 334 MG/5ML; MG/5ML
15 SOLUTION ORAL EVERY 6 HOURS
Refills: 0 | Status: DISCONTINUED | OUTPATIENT
Start: 2024-11-23 | End: 2024-11-25

## 2024-11-23 RX ORDER — CHLORHEXIDINE GLUCONATE 1.2 MG/ML
1 RINSE ORAL DAILY
Refills: 0 | Status: DISCONTINUED | OUTPATIENT
Start: 2024-11-23 | End: 2024-11-25

## 2024-11-23 RX ORDER — INFLUENZA VIRUS VACCINE 15; 15; 15; 15 UG/.5ML; UG/.5ML; UG/.5ML; UG/.5ML
0.5 SUSPENSION INTRAMUSCULAR ONCE
Refills: 0 | Status: DISCONTINUED | OUTPATIENT
Start: 2024-11-23 | End: 2024-11-28

## 2024-11-23 RX ADMIN — Medication 100 MILLILITER(S): at 19:50

## 2024-11-23 RX ADMIN — Medication 100 MILLILITER(S): at 20:20

## 2024-11-23 RX ADMIN — Medication 125 MILLILITER(S): at 19:50

## 2024-11-23 RX ADMIN — CHLORHEXIDINE GLUCONATE 1 APPLICATION(S): 1.2 RINSE ORAL at 19:30

## 2024-11-23 NOTE — OB RN PATIENT PROFILE - NSPRENATALGBS_OBGYN_ALL_OB_START_DATE
CTA CHEST WITH IV CONTRAST



INDICATION:

Chest pain.



TECHNIQUE:

Axial CT images were obtained through the chest after injection of IV contrast. 3 plane MIP reconstru
ctions were produced. All CT scans at this location are performed using CT dose reduction for ALARA b
y means of automated exposure control. 



COMPARISON:

None available.



FINDINGS:

Pulmonary Arteries: No pulmonary emboli.



Lungs: No significant abnormality.

Trachea and Bronchi:  No significant abnormality.



Heart and Pericardium: No significant abnormality.

Vasculature: No significant abnormality.

Lymphatics: No lymphadenopathy.



Additional Findings: None.



Upper Abdomen: No acute findings.



Skeletal Structures: No significant osseous abnormality.



IMPRESSION:

1. No CT evidence for pulmonary embolism. 

2. No acute findings.



Signer Name: Brian Hui MD 

Signed: 8/15/2020 2:45 PM

Workstation Name: [id number]
25-Oct-2024

## 2024-11-23 NOTE — OB PROVIDER H&P - NSHPPHYSICALEXAM_GEN_ALL_CORE
Objective  – VS  T(C): 37 (11-23-24 @ 18:36)  HR: 105 (11-23-24 @ 20:02)  BP: 127/83 (11-23-24 @ 20:02)  RR: 16 (11-23-24 @ 18:36)  SpO2: --    Physical Exam  CV: RRR  Pulm: breathing comfortably on RA  Abd: gravid, nontender  Extr: moving all extremities with ease  – FS: 139, repeat in one hour 93  – VE: L/C/P  – FHT: baseline 130, mod variability, +accels, -decels  – [place]: no ctx  – EFW: 3250 extrapolated from 11/11 sono  – Sono: vertex

## 2024-11-23 NOTE — OB PROVIDER H&P - ASSESSMENT
Assessment  24y  at 37w4d presents for IOL for T2DM and gHTN.     Plan  1. Admit to L+D. Routine Labs. IVF.  2. IOL with buccal cytotec and cervical balloon  3. Fetus: cat 1 tracing. VTX. EFW 3250 extrapolated from  sono. Continuous EFM. Sono. No concerns.  4. Prenatal issues: T2DM, gHTN  5. GBS neg  6. Pain: IV pain meds/epidural PRN  7. T2DM: FS per protocol, rotating fluids  8. gHTN: HELLP labs sent on admission  9. PPH risk: 2 IVs, 2 units PRBC on hold    Plan per attending physician, Dr. Vinny Ram PGY1

## 2024-11-23 NOTE — OB PROVIDER H&P - NSLOWPPHRISK_OBGYN_A_OB
No previous uterine incision/[place] Pregnancy/Less than or equal to 4 previous vaginal births/No known bleeding disorder/No history of postpartum hemorrhage

## 2024-11-23 NOTE — OB PROVIDER H&P - HISTORY OF PRESENT ILLNESS
R1 Admission H&P    Subjective  HPI: 24y  at 37w4d presents for IOL for T2DM and gHTN. +FM. -LOF. -CTXs. -VB. Pt denies any other concerns.    – PNC complicated by uncontrolled T2DM, elevated BPs. GBS neg.  EFW 3250 extrapolated from  sono  – OBHx: primip  – GynHx: denies fibroids, cysts, endometriosis, abnormal pap smears, STIs  – PMH: T2DM on metformin 1000mg BID, Lantus 96u qhs, Novolog 64 with brunch, 76 with dinner. Follows with Dr. Burch at [facility] for [facility]o.  – PSH: denies  – Psych: anxiety/emotional dysregulation not on meds or in therapy  – Social: denies   – Meds: PNV, bASA, Fe, Vit D   – Allergies: NKDA  – Will accept blood transfusions? Yes

## 2024-11-24 LAB
GLUCOSE BLDC GLUCOMTR-MCNC: 101 MG/DL — HIGH (ref 70–99)
GLUCOSE BLDC GLUCOMTR-MCNC: 79 MG/DL — SIGNIFICANT CHANGE UP (ref 70–99)
GLUCOSE BLDC GLUCOMTR-MCNC: 86 MG/DL — SIGNIFICANT CHANGE UP (ref 70–99)
GLUCOSE BLDC GLUCOMTR-MCNC: 89 MG/DL — SIGNIFICANT CHANGE UP (ref 70–99)
GLUCOSE BLDC GLUCOMTR-MCNC: 93 MG/DL — SIGNIFICANT CHANGE UP (ref 70–99)
GLUCOSE BLDC GLUCOMTR-MCNC: 94 MG/DL — SIGNIFICANT CHANGE UP (ref 70–99)
T PALLIDUM AB TITR SER: NEGATIVE — SIGNIFICANT CHANGE UP

## 2024-11-24 RX ORDER — ONDANSETRON HYDROCHLORIDE 4 MG/1
4 TABLET, FILM COATED ORAL ONCE
Refills: 0 | Status: COMPLETED | OUTPATIENT
Start: 2024-11-24 | End: 2024-11-24

## 2024-11-24 RX ORDER — 0.9 % SODIUM CHLORIDE 0.9 %
1000 INTRAVENOUS SOLUTION INTRAVENOUS
Refills: 0 | Status: DISCONTINUED | OUTPATIENT
Start: 2024-11-24 | End: 2024-11-25

## 2024-11-24 RX ORDER — ACETAMINOPHEN 500MG 500 MG/1
1000 TABLET, COATED ORAL ONCE
Refills: 0 | Status: COMPLETED | OUTPATIENT
Start: 2024-11-24 | End: 2024-11-24

## 2024-11-24 RX ADMIN — ONDANSETRON HYDROCHLORIDE 4 MILLIGRAM(S): 4 TABLET, FILM COATED ORAL at 20:51

## 2024-11-24 RX ADMIN — ACETAMINOPHEN 500MG 400 MILLIGRAM(S): 500 TABLET, COATED ORAL at 20:10

## 2024-11-24 RX ADMIN — Medication 2 MILLIUNIT(S)/MIN: at 17:14

## 2024-11-24 RX ADMIN — ACETAMINOPHEN 500MG 1000 MILLIGRAM(S): 500 TABLET, COATED ORAL at 21:00

## 2024-11-24 NOTE — OB PROVIDER LABOR PROGRESS NOTE - NS_OBIHICONTRACTIONPATTERNDETAILS_OBGYN_ALL_OB_FT
Difficulty obtaining contraction pattern via TOCO, IUPC placement dw patient, patient verbalizes understanding
q3-4 min

## 2024-11-24 NOTE — OB PROVIDER LABOR PROGRESS NOTE - ASSESSMENT
- Patient initially stating she wanted to continue sleeping, however, after discussion regarding the strategy of optimizing her labor course she is understanding of the recommendation for cervical balloon placement and in agreement  - Patient would like to have epidural prior to cervical balloon placement. Anesthesia called.   - Category I FHT     Meron Ott, PGY4
 @37.6wks gHTN, T2DM  sp cytotec/cooks cervical balloon  AROM clear without incident  IUPC placed  Cont pitocin titration, currently@10mu  Cont fetal/maternal monitoring  Cont FS, cont BP monitoring   Will reassess PRN    lashonda Pizarro NP

## 2024-11-24 NOTE — CHART NOTE - NSCHARTNOTEFT_GEN_A_CORE
Patient seen at bedside   resting comfortably with epidural in place   tracing category 1   toco: irregular   ve:1/30/-3   cervical balloon placed w/60/60   patient tolerated well   continue with cytotec   reassess as needed

## 2024-11-25 ENCOUNTER — TRANSCRIPTION ENCOUNTER (OUTPATIENT)
Age: 24
End: 2024-11-25

## 2024-11-25 ENCOUNTER — APPOINTMENT (OUTPATIENT)
Dept: MATERNAL FETAL MEDICINE | Facility: CLINIC | Age: 24
End: 2024-11-25

## 2024-11-25 LAB
ALBUMIN SERPL ELPH-MCNC: 2.8 G/DL — LOW (ref 3.3–5)
ALBUMIN SERPL ELPH-MCNC: 2.9 G/DL — LOW (ref 3.3–5)
ALP SERPL-CCNC: 168 U/L — HIGH (ref 40–120)
ALP SERPL-CCNC: 169 U/L — HIGH (ref 40–120)
ALT FLD-CCNC: 11 U/L — SIGNIFICANT CHANGE UP (ref 4–33)
ALT FLD-CCNC: 11 U/L — SIGNIFICANT CHANGE UP (ref 4–33)
ANION GAP SERPL CALC-SCNC: 14 MMOL/L — SIGNIFICANT CHANGE UP (ref 7–14)
ANION GAP SERPL CALC-SCNC: 15 MMOL/L — HIGH (ref 7–14)
APPEARANCE UR: CLEAR — SIGNIFICANT CHANGE UP
APTT BLD: 29.6 SEC — SIGNIFICANT CHANGE UP (ref 24.5–35.6)
AST SERPL-CCNC: 19 U/L — SIGNIFICANT CHANGE UP (ref 4–32)
AST SERPL-CCNC: 19 U/L — SIGNIFICANT CHANGE UP (ref 4–32)
B-OH-BUTYR SERPL-SCNC: 2.3 MMOL/L — HIGH (ref 0–0.4)
BACTERIA # UR AUTO: NEGATIVE /HPF — SIGNIFICANT CHANGE UP
BASE EXCESS BLDCOV CALC-SCNC: -5.8 MMOL/L — SIGNIFICANT CHANGE UP (ref -9.3–0.3)
BASOPHILS # BLD AUTO: 0.02 K/UL — SIGNIFICANT CHANGE UP (ref 0–0.2)
BASOPHILS # BLD AUTO: 0.02 K/UL — SIGNIFICANT CHANGE UP (ref 0–0.2)
BASOPHILS NFR BLD AUTO: 0.1 % — SIGNIFICANT CHANGE UP (ref 0–2)
BASOPHILS NFR BLD AUTO: 0.2 % — SIGNIFICANT CHANGE UP (ref 0–2)
BILIRUB SERPL-MCNC: 0.8 MG/DL — SIGNIFICANT CHANGE UP (ref 0.2–1.2)
BILIRUB SERPL-MCNC: 0.8 MG/DL — SIGNIFICANT CHANGE UP (ref 0.2–1.2)
BILIRUB UR-MCNC: NEGATIVE — SIGNIFICANT CHANGE UP
BUN SERPL-MCNC: 4 MG/DL — LOW (ref 7–23)
BUN SERPL-MCNC: 4 MG/DL — LOW (ref 7–23)
CALCIUM SERPL-MCNC: 8.2 MG/DL — LOW (ref 8.4–10.5)
CALCIUM SERPL-MCNC: 8.4 MG/DL — SIGNIFICANT CHANGE UP (ref 8.4–10.5)
CAST: 1 /LPF — SIGNIFICANT CHANGE UP (ref 0–4)
CHLORIDE SERPL-SCNC: 104 MMOL/L — SIGNIFICANT CHANGE UP (ref 98–107)
CHLORIDE SERPL-SCNC: 106 MMOL/L — SIGNIFICANT CHANGE UP (ref 98–107)
CO2 BLDCOV-SCNC: 21 MMOL/L — SIGNIFICANT CHANGE UP
CO2 SERPL-SCNC: 17 MMOL/L — LOW (ref 22–31)
CO2 SERPL-SCNC: 19 MMOL/L — LOW (ref 22–31)
COLOR SPEC: YELLOW — SIGNIFICANT CHANGE UP
CREAT SERPL-MCNC: 0.45 MG/DL — LOW (ref 0.5–1.3)
CREAT SERPL-MCNC: 0.47 MG/DL — LOW (ref 0.5–1.3)
DIFF PNL FLD: ABNORMAL
EGFR: 137 ML/MIN/1.73M2 — SIGNIFICANT CHANGE UP
EGFR: 139 ML/MIN/1.73M2 — SIGNIFICANT CHANGE UP
EOSINOPHIL # BLD AUTO: 0.01 K/UL — SIGNIFICANT CHANGE UP (ref 0–0.5)
EOSINOPHIL # BLD AUTO: 0.03 K/UL — SIGNIFICANT CHANGE UP (ref 0–0.5)
EOSINOPHIL NFR BLD AUTO: 0.1 % — SIGNIFICANT CHANGE UP (ref 0–6)
EOSINOPHIL NFR BLD AUTO: 0.3 % — SIGNIFICANT CHANGE UP (ref 0–6)
FIBRINOGEN PPP-MCNC: 386 MG/DL — SIGNIFICANT CHANGE UP (ref 200–465)
GAS PNL BLDCOV: 7.31 — SIGNIFICANT CHANGE UP (ref 7.25–7.45)
GLUCOSE BLDC GLUCOMTR-MCNC: 102 MG/DL — HIGH (ref 70–99)
GLUCOSE BLDC GLUCOMTR-MCNC: 114 MG/DL — HIGH (ref 70–99)
GLUCOSE BLDC GLUCOMTR-MCNC: 133 MG/DL — HIGH (ref 70–99)
GLUCOSE BLDC GLUCOMTR-MCNC: 163 MG/DL — HIGH (ref 70–99)
GLUCOSE BLDC GLUCOMTR-MCNC: 98 MG/DL — SIGNIFICANT CHANGE UP (ref 70–99)
GLUCOSE SERPL-MCNC: 156 MG/DL — HIGH (ref 70–99)
GLUCOSE SERPL-MCNC: 186 MG/DL — HIGH (ref 70–99)
GLUCOSE UR QL: NEGATIVE MG/DL — SIGNIFICANT CHANGE UP
HCO3 BLDCOV-SCNC: 20 MMOL/L — SIGNIFICANT CHANGE UP
HCT VFR BLD CALC: 24.6 % — LOW (ref 34.5–45)
HCT VFR BLD CALC: 25.6 % — LOW (ref 34.5–45)
HCT VFR BLD CALC: 30.9 % — LOW (ref 34.5–45)
HGB BLD-MCNC: 10.1 G/DL — LOW (ref 11.5–15.5)
HGB BLD-MCNC: 8.2 G/DL — LOW (ref 11.5–15.5)
HGB BLD-MCNC: 8.3 G/DL — LOW (ref 11.5–15.5)
IANC: 12.84 K/UL — HIGH (ref 1.8–7.4)
IANC: 8.61 K/UL — HIGH (ref 1.8–7.4)
IMM GRANULOCYTES NFR BLD AUTO: 0.5 % — SIGNIFICANT CHANGE UP (ref 0–0.9)
IMM GRANULOCYTES NFR BLD AUTO: 0.5 % — SIGNIFICANT CHANGE UP (ref 0–0.9)
INR BLD: 1.08 RATIO — SIGNIFICANT CHANGE UP (ref 0.85–1.16)
KETONES UR-MCNC: 80 MG/DL
LACTATE SERPL-SCNC: 1.3 MMOL/L — SIGNIFICANT CHANGE UP (ref 0.5–2)
LDH SERPL L TO P-CCNC: 186 U/L — SIGNIFICANT CHANGE UP (ref 135–225)
LEUKOCYTE ESTERASE UR-ACNC: NEGATIVE — SIGNIFICANT CHANGE UP
LYMPHOCYTES # BLD AUTO: 0.7 K/UL — LOW (ref 1–3.3)
LYMPHOCYTES # BLD AUTO: 0.93 K/UL — LOW (ref 1–3.3)
LYMPHOCYTES # BLD AUTO: 4.9 % — LOW (ref 13–44)
LYMPHOCYTES # BLD AUTO: 9 % — LOW (ref 13–44)
MCHC RBC-ENTMCNC: 28.3 PG — SIGNIFICANT CHANGE UP (ref 27–34)
MCHC RBC-ENTMCNC: 28.5 PG — SIGNIFICANT CHANGE UP (ref 27–34)
MCHC RBC-ENTMCNC: 28.8 PG — SIGNIFICANT CHANGE UP (ref 27–34)
MCHC RBC-ENTMCNC: 32.4 G/DL — SIGNIFICANT CHANGE UP (ref 32–36)
MCHC RBC-ENTMCNC: 32.7 G/DL — SIGNIFICANT CHANGE UP (ref 32–36)
MCHC RBC-ENTMCNC: 33.3 G/DL — SIGNIFICANT CHANGE UP (ref 32–36)
MCV RBC AUTO: 86.3 FL — SIGNIFICANT CHANGE UP (ref 80–100)
MCV RBC AUTO: 87 FL — SIGNIFICANT CHANGE UP (ref 80–100)
MCV RBC AUTO: 87.4 FL — SIGNIFICANT CHANGE UP (ref 80–100)
MONOCYTES # BLD AUTO: 0.63 K/UL — SIGNIFICANT CHANGE UP (ref 0–0.9)
MONOCYTES # BLD AUTO: 0.74 K/UL — SIGNIFICANT CHANGE UP (ref 0–0.9)
MONOCYTES NFR BLD AUTO: 4.4 % — SIGNIFICANT CHANGE UP (ref 2–14)
MONOCYTES NFR BLD AUTO: 7.1 % — SIGNIFICANT CHANGE UP (ref 2–14)
NEUTROPHILS # BLD AUTO: 12.84 K/UL — HIGH (ref 1.8–7.4)
NEUTROPHILS # BLD AUTO: 8.61 K/UL — HIGH (ref 1.8–7.4)
NEUTROPHILS NFR BLD AUTO: 82.9 % — HIGH (ref 43–77)
NEUTROPHILS NFR BLD AUTO: 90 % — HIGH (ref 43–77)
NITRITE UR-MCNC: NEGATIVE — SIGNIFICANT CHANGE UP
NRBC # BLD: 0 /100 WBCS — SIGNIFICANT CHANGE UP (ref 0–0)
NRBC # FLD: 0 K/UL — SIGNIFICANT CHANGE UP (ref 0–0)
PCO2 BLDCOV: 40 MMHG — SIGNIFICANT CHANGE UP (ref 27–49)
PH UR: 6 — SIGNIFICANT CHANGE UP (ref 5–8)
PLATELET # BLD AUTO: 189 K/UL — SIGNIFICANT CHANGE UP (ref 150–400)
PLATELET # BLD AUTO: 191 K/UL — SIGNIFICANT CHANGE UP (ref 150–400)
PLATELET # BLD AUTO: 209 K/UL — SIGNIFICANT CHANGE UP (ref 150–400)
PO2 BLDCOA: 85 MMHG — HIGH (ref 17–41)
POTASSIUM SERPL-MCNC: 3.3 MMOL/L — LOW (ref 3.5–5.3)
POTASSIUM SERPL-MCNC: 3.8 MMOL/L — SIGNIFICANT CHANGE UP (ref 3.5–5.3)
POTASSIUM SERPL-SCNC: 3.3 MMOL/L — LOW (ref 3.5–5.3)
POTASSIUM SERPL-SCNC: 3.8 MMOL/L — SIGNIFICANT CHANGE UP (ref 3.5–5.3)
PROT SERPL-MCNC: 5.4 G/DL — LOW (ref 6–8.3)
PROT SERPL-MCNC: 5.9 G/DL — LOW (ref 6–8.3)
PROT UR-MCNC: NEGATIVE MG/DL — SIGNIFICANT CHANGE UP
PROTHROM AB SERPL-ACNC: 12.5 SEC — SIGNIFICANT CHANGE UP (ref 9.9–13.4)
RBC # BLD: 2.85 M/UL — LOW (ref 3.8–5.2)
RBC # BLD: 2.93 M/UL — LOW (ref 3.8–5.2)
RBC # BLD: 3.55 M/UL — LOW (ref 3.8–5.2)
RBC # FLD: 14 % — SIGNIFICANT CHANGE UP (ref 10.3–14.5)
RBC # FLD: 14.1 % — SIGNIFICANT CHANGE UP (ref 10.3–14.5)
RBC # FLD: 14.1 % — SIGNIFICANT CHANGE UP (ref 10.3–14.5)
RBC CASTS # UR COMP ASSIST: 11 /HPF — HIGH (ref 0–4)
SAO2 % BLDCOV: 97.3 % — SIGNIFICANT CHANGE UP
SODIUM SERPL-SCNC: 137 MMOL/L — SIGNIFICANT CHANGE UP (ref 135–145)
SODIUM SERPL-SCNC: 138 MMOL/L — SIGNIFICANT CHANGE UP (ref 135–145)
SP GR SPEC: 1.01 — SIGNIFICANT CHANGE UP (ref 1–1.03)
SQUAMOUS # UR AUTO: 1 /HPF — SIGNIFICANT CHANGE UP (ref 0–5)
URATE SERPL-MCNC: 5.1 MG/DL — SIGNIFICANT CHANGE UP (ref 2.5–7)
UROBILINOGEN FLD QL: 0.2 MG/DL — SIGNIFICANT CHANGE UP (ref 0.2–1)
WBC # BLD: 10.38 K/UL — SIGNIFICANT CHANGE UP (ref 3.8–10.5)
WBC # BLD: 14.27 K/UL — HIGH (ref 3.8–10.5)
WBC # BLD: 15.39 K/UL — HIGH (ref 3.8–10.5)
WBC # FLD AUTO: 10.38 K/UL — SIGNIFICANT CHANGE UP (ref 3.8–10.5)
WBC # FLD AUTO: 14.27 K/UL — HIGH (ref 3.8–10.5)
WBC # FLD AUTO: 15.39 K/UL — HIGH (ref 3.8–10.5)
WBC UR QL: 1 /HPF — SIGNIFICANT CHANGE UP (ref 0–5)

## 2024-11-25 PROCEDURE — 59515 CESAREAN DELIVERY: CPT | Mod: AS,U7

## 2024-11-25 PROCEDURE — 93010 ELECTROCARDIOGRAM REPORT: CPT

## 2024-11-25 PROCEDURE — 71045 X-RAY EXAM CHEST 1 VIEW: CPT | Mod: 26

## 2024-11-25 PROCEDURE — 88307 TISSUE EXAM BY PATHOLOGIST: CPT | Mod: 26

## 2024-11-25 PROCEDURE — 93970 EXTREMITY STUDY: CPT | Mod: 26

## 2024-11-25 DEVICE — VISTASEAL FIBRIN HUMAN 10ML: Type: IMPLANTABLE DEVICE | Status: FUNCTIONAL

## 2024-11-25 RX ORDER — PIPERACILLIN SODIUM AND TAZOBACTAM SODIUM 4; .5 G/20ML; G/20ML
4.5 INJECTION, POWDER, LYOPHILIZED, FOR SOLUTION INTRAVENOUS EVERY 8 HOURS
Refills: 0 | Status: COMPLETED | OUTPATIENT
Start: 2024-11-25 | End: 2024-11-26

## 2024-11-25 RX ORDER — ACETAMINOPHEN 500MG 500 MG/1
975 TABLET, COATED ORAL
Refills: 0 | Status: DISCONTINUED | OUTPATIENT
Start: 2024-11-25 | End: 2024-11-28

## 2024-11-25 RX ORDER — 0.9 % SODIUM CHLORIDE 0.9 %
1000 INTRAVENOUS SOLUTION INTRAVENOUS
Refills: 0 | Status: DISCONTINUED | OUTPATIENT
Start: 2024-11-25 | End: 2024-11-28

## 2024-11-25 RX ORDER — ACETAMINOPHEN 500MG 500 MG/1
1000 TABLET, COATED ORAL ONCE
Refills: 0 | Status: COMPLETED | OUTPATIENT
Start: 2024-11-25 | End: 2024-11-25

## 2024-11-25 RX ORDER — KETOROLAC TROMETHAMINE 30 MG/ML
30 INJECTION INTRAMUSCULAR; INTRAVENOUS EVERY 6 HOURS
Refills: 0 | Status: DISCONTINUED | OUTPATIENT
Start: 2024-11-25 | End: 2024-11-26

## 2024-11-25 RX ORDER — CARBOPROST TROMETHAMINE 250 UG/ML
250 INJECTION, SOLUTION INTRAMUSCULAR ONCE
Refills: 0 | Status: COMPLETED | OUTPATIENT
Start: 2024-11-25 | End: 2024-11-25

## 2024-11-25 RX ORDER — HEPARIN SODIUM,PORCINE 1000/ML
10000 VIAL (ML) INJECTION EVERY 12 HOURS
Refills: 0 | Status: DISCONTINUED | OUTPATIENT
Start: 2024-11-25 | End: 2024-11-28

## 2024-11-25 RX ORDER — 0.9 % SODIUM CHLORIDE 0.9 %
300 INTRAVENOUS SOLUTION INTRAVENOUS ONCE
Refills: 0 | Status: COMPLETED | OUTPATIENT
Start: 2024-11-25 | End: 2024-11-25

## 2024-11-25 RX ORDER — GLUCAGON INJECTION, SOLUTION 0.5 MG/.1ML
1 INJECTION, SOLUTION SUBCUTANEOUS ONCE
Refills: 0 | Status: DISCONTINUED | OUTPATIENT
Start: 2024-11-25 | End: 2024-11-28

## 2024-11-25 RX ORDER — LANOLIN 72 %
1 OINTMENT (GRAM) TOPICAL EVERY 6 HOURS
Refills: 0 | Status: DISCONTINUED | OUTPATIENT
Start: 2024-11-25 | End: 2024-11-28

## 2024-11-25 RX ORDER — TETANUS TOXOID, REDUCED DIPHTHERIA TOXOID AND ACELLULAR PERTUSSIS VACCINE, ADSORBED 5; 2.5; 8; 8; 2.5 [IU]/.5ML; [IU]/.5ML; UG/.5ML; UG/.5ML; UG/.5ML
0.5 SUSPENSION INTRAMUSCULAR ONCE
Refills: 0 | Status: DISCONTINUED | OUTPATIENT
Start: 2024-11-25 | End: 2024-11-28

## 2024-11-25 RX ORDER — DIPHENHYDRAMINE HCL 25 MG
25 CAPSULE ORAL EVERY 6 HOURS
Refills: 0 | Status: DISCONTINUED | OUTPATIENT
Start: 2024-11-25 | End: 2024-11-28

## 2024-11-25 RX ORDER — 0.9 % SODIUM CHLORIDE 0.9 %
500 INTRAVENOUS SOLUTION INTRAVENOUS ONCE
Refills: 0 | Status: DISCONTINUED | OUTPATIENT
Start: 2024-11-25 | End: 2024-11-25

## 2024-11-25 RX ORDER — SIMETHICONE 125 MG
80 CAPSULE ORAL EVERY 4 HOURS
Refills: 0 | Status: DISCONTINUED | OUTPATIENT
Start: 2024-11-25 | End: 2024-11-28

## 2024-11-25 RX ORDER — FAMOTIDINE 20 MG/1
20 TABLET, FILM COATED ORAL ONCE
Refills: 0 | Status: COMPLETED | OUTPATIENT
Start: 2024-11-25 | End: 2024-11-25

## 2024-11-25 RX ORDER — OXYCODONE HYDROCHLORIDE 30 MG/1
5 TABLET ORAL ONCE
Refills: 0 | Status: DISCONTINUED | OUTPATIENT
Start: 2024-11-25 | End: 2024-11-28

## 2024-11-25 RX ORDER — CARBOPROST TROMETHAMINE 250 UG/ML
250 INJECTION, SOLUTION INTRAMUSCULAR
Refills: 0 | Status: DISCONTINUED | OUTPATIENT
Start: 2024-11-25 | End: 2024-11-25

## 2024-11-25 RX ORDER — POTASSIUM CHLORIDE 600 MG/1
40 TABLET, EXTENDED RELEASE ORAL EVERY 6 HOURS
Refills: 0 | Status: COMPLETED | OUTPATIENT
Start: 2024-11-25 | End: 2024-11-26

## 2024-11-25 RX ORDER — IBUPROFEN 200 MG
600 TABLET ORAL EVERY 6 HOURS
Refills: 0 | Status: COMPLETED | OUTPATIENT
Start: 2024-11-25 | End: 2025-10-24

## 2024-11-25 RX ORDER — OXYCODONE HYDROCHLORIDE 30 MG/1
5 TABLET ORAL
Refills: 0 | Status: DISCONTINUED | OUTPATIENT
Start: 2024-11-25 | End: 2024-11-28

## 2024-11-25 RX ORDER — TRISODIUM CITRATE DIHYDRATE AND CITRIC ACID MONOHYDRATE 500; 334 MG/5ML; MG/5ML
30 SOLUTION ORAL ONCE
Refills: 0 | Status: COMPLETED | OUTPATIENT
Start: 2024-11-25 | End: 2024-11-25

## 2024-11-25 RX ADMIN — CARBOPROST TROMETHAMINE 250 MICROGRAM(S): 250 INJECTION, SOLUTION INTRAMUSCULAR at 13:50

## 2024-11-25 RX ADMIN — Medication 100 MILLILITER(S): at 08:30

## 2024-11-25 RX ADMIN — PIPERACILLIN SODIUM AND TAZOBACTAM SODIUM 200 GRAM(S): 4; .5 INJECTION, POWDER, LYOPHILIZED, FOR SOLUTION INTRAVENOUS at 11:42

## 2024-11-25 RX ADMIN — Medication 10000 UNIT(S): at 23:43

## 2024-11-25 RX ADMIN — CHLORHEXIDINE GLUCONATE 1 APPLICATION(S): 1.2 RINSE ORAL at 12:16

## 2024-11-25 RX ADMIN — KETOROLAC TROMETHAMINE 30 MILLIGRAM(S): 30 INJECTION INTRAMUSCULAR; INTRAVENOUS at 23:49

## 2024-11-25 RX ADMIN — PIPERACILLIN SODIUM AND TAZOBACTAM SODIUM 200 GRAM(S): 4; .5 INJECTION, POWDER, LYOPHILIZED, FOR SOLUTION INTRAVENOUS at 20:04

## 2024-11-25 RX ADMIN — POTASSIUM CHLORIDE 40 MILLIEQUIVALENT(S): 600 TABLET, EXTENDED RELEASE ORAL at 21:00

## 2024-11-25 RX ADMIN — FAMOTIDINE 20 MILLIGRAM(S): 20 TABLET, FILM COATED ORAL at 12:15

## 2024-11-25 RX ADMIN — ACETAMINOPHEN 500MG 1000 MILLIGRAM(S): 500 TABLET, COATED ORAL at 11:51

## 2024-11-25 RX ADMIN — ACETAMINOPHEN 500MG 400 MILLIGRAM(S): 500 TABLET, COATED ORAL at 11:21

## 2024-11-25 RX ADMIN — Medication 83 MILLILITER(S): at 20:44

## 2024-11-25 RX ADMIN — CARBOPROST TROMETHAMINE 250 MICROGRAM(S): 250 INJECTION, SOLUTION INTRAMUSCULAR at 13:35

## 2024-11-25 RX ADMIN — Medication 167 MILLIUNIT(S)/MIN: at 03:05

## 2024-11-25 RX ADMIN — CHLORHEXIDINE GLUCONATE 1 APPLICATION(S): 1.2 RINSE ORAL at 06:45

## 2024-11-25 RX ADMIN — Medication 600 MILLILITER(S): at 10:34

## 2024-11-25 RX ADMIN — TRISODIUM CITRATE DIHYDRATE AND CITRIC ACID MONOHYDRATE 30 MILLILITER(S): 500; 334 SOLUTION ORAL at 12:15

## 2024-11-25 NOTE — OB NEONATOLOGY/PEDIATRICIAN DELIVERY SUMMARY - NSPEDSNEONOTESA_OBGYN_ALL_OB_FT
Peds called to OR for Cat 2 tracing and chorioamnitis of a 38 wk LGA female born via CS to a 23 y/o  mother. Maternal medical/surgical/pregnancy history of GDMA2, anxiety, gHTN. Maternal labs include Blood Type O+ , HIV - , RPR NR , Rubella I , Hep B - , GBS - 10/25. ROM at 2321 on  with clear fluids (ROM hours: 14H). Baby emerged vigorous, crying, was warmed, dried, suctioned, and stimulated with APGARS of 9/9, delayed cord clamping 60 seconds. Mom plans to initiate breastfeeding / formula feed, consents Hep B vaccine.  Highest maternal temp: 38.2. EOS 1.2.       TOB 1321  BW: 3810    Physical Exam:  Gen: no acute distress, +grimace  HEENT: moderate caput anterior fontanel open soft and flat, nondysmorphic facies, no cleft lip/palate, ears normal set, no ear pits or tags, nares clinically patent  Resp: Normal respiratory effort without grunting or retractions, good air entry b/l, clear to auscultation bilaterally  Cardio: Present S1/S2, regular rate and rhythm, no murmurs  Abd: soft, non tender, non distended, umbilical cord with 3 vessels  Neuro: +palmar and plantar grasp, +suck, +fam, normal tone  Extremities: negative delatorre and ortolani maneuvers, moving all extremities, no clavicular crepitus or stepoff  Skin: pink, warm, mild acrocyanosis  Genitals: Normal female anatomy, Miguel 1, anus patent

## 2024-11-25 NOTE — OB RN DELIVERY SUMMARY - NSSELHIDDEN_OBGYN_ALL_OB_FT
[NS_DeliveryAttending1_OBGYN_ALL_OB_FT:[id number]=],[NS_DeliveryRN_OBGYN_ALL_OB_FT:[id number]]

## 2024-11-25 NOTE — OB PROVIDER LABOR PROGRESS NOTE - NS_SUBJECTIVE/OBJECTIVE_OBGYN_ALL_OB_FT
At bedside to discuss cervical balloon placement. Patient is nervous about pain and would like an epidural before balloon placement, however, stating she would like to continue to sleep more.
Patient seen at the bedside for VE
Patient seen and examined for AROM. Patient with epidural. No complaints.  VS  T(C): 36.7 (11-24-24 @ 20:18)  HR: 94 (11-24-24 @ 23:29)  BP: 115/58 (11-24-24 @ 23:18)  RR: 15 (11-24-24 @ 20:18)  SpO2: 97% (11-24-24 @ 23:29)

## 2024-11-25 NOTE — CHART NOTE - NSCHARTNOTEFT_GEN_A_CORE
T(C): 38.2 (11-25-24 @ 10:06), Max: 38.2 (11-25-24 @ 10:06)  T(F): 100.76 (11-25-24 @ 10:06), Max: 100.76 (11-25-24 @ 10:06)  HR: 109 (11-25-24 @ 10:26) (87 - 181)  BP: 124/59 (11-25-24 @ 09:58) (102/57 - 154/62)  RR: 17 (11-25-24 @ 09:59) (15 - 18)  SpO2: 96% (11-25-24 @ 10:26) (84% - 100%)  Wt(kg): --    BP x 24 hours: BP:  (102/57 - 154/62)      LABS:  cret                        10.2   7.31  )-----------( 216      ( 23 Nov 2024 19:50 )             30.5     11-23    138  |  104  |  6[L]  ----------------------------<  113[H]  3.4[L]   |  21[L]  |  0.45[L]    Ca    9.1      23 Nov 2024 19:50    TPro  6.8  /  Alb  3.6  /  TBili  0.4  /  DBili  x   /  AST  21  /  ALT  12  /  AlkPhos  188[H]  11-23    ----------------------------------------------------------------------------------------------------------------------------------    isolated maternal fever x 1    -uncontrolled DMII  -GHTN  -isolated maternal temp, rectal 38.2c  -AROM since 2312pm on 11/24/24   -IUPC remains in place  -maternal tachycardia noted  -NO fetal tachycardia present      PLAN    1. LR bolus 300ml    2. CBC ordered now    3. Repeat rectal temp in 30 minutes from initial elevated temp (fever 38.2c)    4. continue to monitor        Discussed with MD Lux Almazan

## 2024-11-25 NOTE — OB RN DELIVERY SUMMARY - NS_SPECIMENTYPE_OBGYN_ALL_OB
Subjective:     Kerri Newell is a 86 y.o. female who presents with DM.    HPI:   Seen in f/u for DM.  She has a bleedd in left eye last wk tx's with eye injection.  Still having blurred vision.  She is followed by ophth.    Her ears have been bothering her.  She is eating lots of salads and fruit.  Uses beef 3x/wk.  She doesn't exercise but takes care of her house and does the cooking.    As she gets older she gets fatigued faster.  She is followed closely by cardiac and pulm.  pulm has been giving her dif meds to get her breathing improved.    She has renal cysts.  She saw urology for incontinence.  se was sent to [facility] at [place].  She has had w/u with her.   They have been working with different meds.  She has now stopped all  Tx for her urge incontinence.  She wears a pad.  No UTI.  Renal cysts are not a concerns.    Reviewed lab with pt.  UA, CMP is wnl except glucose elevated.    GFR is mildly dec at 51 but up from 43.  She has been drinking more fluids.   LP shows trg are down from 248 to 228 with inc in tricor from 3x/wk to 4x/wk.  shaye med.  HDL is at goal. LDL is up from 90's to 112.  Goal is <100.  Intolerant of statins.   Alb/cr ratio is up from 0 to 14.  SBP is sl elevated today.  a1c is down from 6.5 to 6.4 but now 6.1.  Taking meds approp.  Needs meds refilled.   She is well controlled on aciphex for her GERD.      Patient Active Problem List    Diagnosis Date Noted   • Hyperlipidemia LDL goal <70 08/24/2021   • Complete heart block (HCC)    • Stage 3b chronic kidney disease (HCC)    • DM (diabetes mellitus) type II, controlled, with peripheral vascular disorder (HCC) 09/16/2018   • Age-related cataract of both eyes    • Vitamin D deficiency    • Esophageal stricture    • Diverticulosis    • Osteopenia of multiple sites    • Hiatal hernia    • Arthritis    • Glaucoma    • Essential hypertension, benign 09/04/2012   • COPD (chronic obstructive pulmonary disease) ([facility]) 04/05/2012   • GERD  (gastroesophageal reflux disease) 07/24/2009       Current medicines (including changes today)  Current Outpatient Medications   Medication Sig Dispense Refill   • fenofibrate (TRICOR) 48 MG Tab Take 1 tab 5x/wk 180 Tablet 0   • felodipine ER (PLENDIL) 10 MG TABLET SR 24 HR Take 1 Tablet by mouth every day. 90 Tablet 3   • felodipine ER (PLENDIL) 2.5 MG TABLET SR 24 HR Take 1 Tablet by mouth every day. 90 Tablet 3   • rabeprazole (ACIPHEX) 20 MG tablet Take 1 Tablet by mouth every day. Take on empty stomach in AM 90 Tablet 3   • SITagliptin (JANUVIA) 100 MG Tab Take 1 Tablet by mouth every day. 90 Tablet 3   • metFORMIN (GLUCOPHAGE) 500 MG Tab Take 1 Tablet by mouth 2 times a day with meals. 180 Tablet 3   • losartan-hydrochlorothiazide (HYZAAR) 50-12.5 MG per tablet Take 1 Tablet by mouth every day. 90 Tablet 3   • albuterol (PROAIR HFA) 108 (90 Base) MCG/ACT Aero Soln inhalation aerosol Inhale 2 Puffs every 6 hours as needed for Shortness of Breath. 3 Each 3   • fluticasone (FLONASE) 50 MCG/ACT nasal spray Administer 2 Sprays into affected nostril(S) every day. 9.9 mL 6   • Glucosamine HCl (GLUCOSAMINE PO) Take  by mouth.     • Cholecalciferol (VITAMIN D3) 2000 UNIT Cap Take  by mouth.     • acetaminophen (TYLENOL) 325 MG Tab Take 650 mg by mouth every four hours as needed.     • Cyanocobalamin (B-12) 2500 MCG Tab Take  by mouth.       No current facility-administered medications for this visit.       Allergies   Allergen Reactions   • Stiolto Respimat [Tiotropium Bromide-Olodaterol]      Cannot urinate   • Sulfa Drugs        ROS  Constitutional: Negative. Negative for fever, chills, weight loss, malaise/fatigue and diaphoresis.   HENT: Negative. Negative for hearing loss, ear pain, nosebleeds, congestion, sore throat, neck pain, tinnitus and ear discharge.   Respiratory: Negative. Negative for cough, hemoptysis, sputum production, shortness of breath, wheezing and stridor.   Cardiovascular: Negative. Negative for  "chest pain, palpitations, orthopnea, claudication, leg swelling and PND.   Gastrointestinal: Denies nausea, vomiting, diarrhea, constipation, heartburn, melena or hematochezia.  Genitourinary: Denies dysuria, hematuria, urinary incontinence, frequency or urgency.        Objective:     /76 (BP Location: Right arm, Patient Position: Sitting)   Pulse 76   Temp 35.8 °C (96.5 °F) (Temporal)   Ht 1.676 m (5' 6\")   Wt 80.6 kg (177 lb 9.6 oz)   SpO2 93%  Body mass index is 28.67 kg/m².    Physical Exam:  Physical Exam   Vitals reviewed.  Constitutional: oriented to person, place, and time. appears well-developed and well-nourished. No distress.   HENT:  Head: Normocephalic and atraumatic. Right Ear: External ear normal. Left Ear: External ear normal. Nose: Nose normal. Mouth/Throat: Oropharynx is clear and moist. No oropharyngeal exudate.  left tm wnl. Rt tm occluded with cerumen.  Eyes: Right eye exhibits no discharge. Left eye exhibits no discharge. No scleral icterus.  Neck: No JVD present.  Cardiovascular: Normal rate, regular rhythm, normal heart sounds and intact distal pulses.  Exam reveals no gallop and no friction rub.  No murmur heard.  No carotid bruits.   Pulmonary/Chest: Effort normal and breath sounds normal. No stridor. No respiratory distress. no wheezes or rales. exhibits no tenderness.   Musculoskeletal: Normal range of motion. exhibits no edema. micky pedal pulses 2+.  Lymphadenopathy: no cervical or supraclavicular adenopathy.   Neurological: alert and oriented to person, place, and time. exhibits normal muscle tone. Coordination normal.   Skin: Skin is warm and dry. no diaphoresis.   Psychiatric: normal mood and affect. behavior is normal.        Assessment and Plan:     The following treatment plan was discussed:    1. Hypertriglyceridemia  fenofibrate (TRICOR) 48 MG Tab    Lipid Profile    Comp Metabolic Panel    trg & HDL not at goal.  enc pt to improve healthy diet & inc regular exercise. " inc tricor to 5x/wk.  do lab & f/u 4 mo for review or sooner if s/e to tricor inc   2. DM (diabetes mellitus) type II, controlled, with peripheral vascular disorder (HCC)  Diabetic Monofilament LE Exam    fenofibrate (TRICOR) 48 MG Tab    HEMOGLOBIN A1C    MICROALBUMIN CREAT RATIO URINE    Lipid Profile    Comp Metabolic Panel    SITagliptin (JANUVIA) 100 MG Tab    metFORMIN (GLUCOPHAGE) 500 MG Tab    last a1c is improved.  will continue meds. improve healthy diet and try do some exercise. f/u 4 mo for lab review.  tam lab. RF meds   3. Essential hypertension, benign  MICROALBUMIN CREAT RATIO URINE    Lipid Profile    Comp Metabolic Panel    felodipine ER (PLENDIL) 10 MG TABLET SR 24 HR    felodipine ER (PLENDIL) 2.5 MG TABLET SR 24 HR    losartan-hydrochlorothiazide (HYZAAR) 50-12.5 MG per tablet    controlled.  refilled meds.     4. Gastroesophageal reflux disease without esophagitis  rabeprazole (ACIPHEX) 20 MG tablet    stable; refilled all meds.     5. Urge incontinence      no further tx planned.  has seen urology and body in motion   6. Stage 3a chronic kidney disease (HCC)      GFR down but cr and alb/cr ratio are wnl.  recheck lab 4 mo.  f/u for review.    7. Chronic obstructive pulmonary disease with acute exacerbation (HCC)      followed by pulm and cardiac for COPD and cardiac issues.     8. Impacted cerumen, right ear      use home debrox for tx   9. Bleeding of eye, left      still having blurred vision.  followed by ophth         Followup: Return in about 4 months (around 9/9/2022).   Placenta

## 2024-11-25 NOTE — BRIEF OPERATIVE NOTE - OPERATION/FINDINGS
Viable female infant, wgt 3810 gms, delivered @1321  Cephalic presentation, clear copious fluid  loose nuchal x 1  hysterotomy closed in single layer  Right sided hysterotomy extension repaired  Intra uterine Hemabate x2  2g TXA given intraoperatively  Vistaseal placed over hysterotomy  Pt noted to be tachypneic in the OR   otherwise grossly nl uterus, tubes & ovaries  QBL: 1718, IVF: 2000, UOP: 500  Dictation Number: [id number]    Postpartum Plan  Epidural to stay in situ until pt cleared to postpartum floor  Zosyn x24 hours  Low dose Insulin sliding scale, Endocrine reccs appreciated for further postpartum T2DM management   EKG, CXR, labs  Incentive spirometer  Otherwise, Routine Care

## 2024-11-25 NOTE — CHART NOTE - NSCHARTNOTEFT_GEN_A_CORE
pt seen/examined at bedside at 11:45am   hx poorly controlled T2DM on insulin and recent dx of gHTN  VE performed, no change since 11pm 24  now w/ chorio (tl: 38.2 at 10:10am), zosyn started  cat 1 reactive tracing  long discussion w pt and her family (pt's mother and fob) regarding my clinical impression for arrest of labor  explained I recommend proceeding w/ PCS due to 12h on pitocin w/ adequate ctx and absolutely no appreciable change on cervical exam  discussed the risk of bleeding, infection, damage to surrounding organs w/ PCS  explained the risk of above is outweighed by the concern for continuing with the induction which could lead to worsening infection/sepsis for either pt or her , PPH, worsening fetal status  pt and her family were given opportunity to ask questions which were answered to their satisfaction  they agree that best course of action would be proceeding w/ PCS  epidural in place  anesthesia and charge rn aware  for azithro and bicitra  txa at time of skin for bleeding ppx

## 2024-11-25 NOTE — OB RN DELIVERY SUMMARY - NS_SEPSISRSKCALC_OBGYN_ALL_OB_FT
EOS calculated successfully. EOS Risk Factor: 0.5/1000 live births ([facility] national incidence); GA=38w;Temp=100.76; ROM=14; GBS='Negative'; Antibiotics='No antibiotics or any antibiotics < 2 hrs prior to birth'  
30-Jun-2019 13:47

## 2024-11-25 NOTE — BRIEF OPERATIVE NOTE - NSICDXBRIEFPREOP_GEN_ALL_CORE_FT
PRE-OP DIAGNOSIS:  Type 2 diabetes mellitus 25-Nov-2024 16:16:30  Marjorie Carter  Gestational hypertension 25-Nov-2024 16:16:39  Marjorie Carter  Failed induction 25-Nov-2024 16:16:53  Marjorie Carter  38 weeks gestation of pregnancy 25-Nov-2024 16:16:59  Marjorie Carter

## 2024-11-25 NOTE — OB RN INTRAOPERATIVE NOTE - NSSELHIDDEN_OBGYN_ALL_OB_FT
[NS_DeliveryAttending1_OBGYN_ALL_OB_FT:[id number]=],[NS_DeliveryRN_OBGYN_ALL_OB_FT:[id number]] [NS_DeliveryAttending1_OBGYN_ALL_OB_FT:[id number]],[NS_DeliveryRN_OBGYN_ALL_OB_FT:[id number]],[NS_DeliveryAssist1_OBGYN_ALL_OB_FT:[id number]]

## 2024-11-25 NOTE — CHART NOTE - NSCHARTNOTEFT_GEN_A_CORE
Delay in charting due to patient care    PA called by RN secondary to patient continuously tachypneic on arrival to PACU (pt noted to be tachypneic in OR as well). Pt seen at bedside resting comfortably. Partner at bedside states this is how the patient breathes when she sleeps at home. Denies CP, SOB, dizziness, pain.     Vitals: ICU Vital Signs Last 24 Hrs  T(C): 37 (25 Nov 2024 15:00), Max: 38.2 (25 Nov 2024 10:06)  T(F): 98.6 (25 Nov 2024 15:00), Max: 100.76 (25 Nov 2024 10:06)  HR: 112 (25 Nov 2024 17:30) (90 - 118)  BP: 89/67 (25 Nov 2024 17:30) (89/67 - 146/77)  BP(mean): 76 (25 Nov 2024 17:30) (76 - 95)  ABP: --  ABP(mean): --  RR: 25 (25 Nov 2024 17:30) (15 - 49)  SpO2: 99% (25 Nov 2024 17:30) (84% - 100%)    General: A&Ox3  Abdomen: soft, nontender, nondistended  Incision: Clean, dry, intact  Uterus: Firm, midline  Lochia: mild                        8.3    14.27 )-----------( 189      ( 25 Nov 2024 15:34 )             25.6   11-25    138  |  106  |  4[L]  ----------------------------<  156[H]  3.3[L]   |  17[L]  |  0.45[L]    Ca    8.2[L]      25 Nov 2024 15:34    TPro  5.4[L]  /  Alb  2.8[L]  /  TBili  0.8  /  DBili  x   /  AST  19  /  ALT  11  /  AlkPhos  169[H]  11-25    Intermittent Tachypnea  Plan  Rule out DKA  DKA labs  Endocrine consulted, Discussed case with Endo Fellow, low suspicion for DKA at this time, Sugar 163, Beta hydroxy-butyrate 2.3  Replete Potassium - 40mEq PO x2 doses, Q4  Bedside Chest Xray pending  EKG Sinus Tachycardia   Incentive spirometer  Lower extremity dopplers rule out DVT  Repeat CBC @730p    Discussed w Dr. Lux ESPINOZA Delay in charting due to patient care    PA called by RN secondary to patient continuously tachypneic on arrival to PACU (pt noted to be tachypneic in OR as well). Pt seen at bedside resting comfortably. Partner at bedside states this is how the patient breathes when she sleeps at home. Pt with known tachycardia, states she followed with Dr. [facility] during pregnancy and had an echo. Denies CP, SOB, dizziness, pain.     Vitals: ICU Vital Signs Last 24 Hrs  T(C): 37 (25 Nov 2024 15:00), Max: 38.2 (25 Nov 2024 10:06)  T(F): 98.6 (25 Nov 2024 15:00), Max: 100.76 (25 Nov 2024 10:06)  HR: 112 (25 Nov 2024 17:30) (90 - 118)  BP: 89/67 (25 Nov 2024 17:30) (89/67 - 146/77)  BP(mean): 76 (25 Nov 2024 17:30) (76 - 95)  ABP: --  ABP(mean): --  RR: 25 (25 Nov 2024 17:30) (15 - 49)  SpO2: 99% (25 Nov 2024 17:30) (84% - 100%)    General: A&Ox3  Abdomen: soft, nontender, nondistended  Incision: Clean, dry, intact  Uterus: Firm, midline  Lochia: mild                        8.3    14.27 )-----------( 189      ( 25 Nov 2024 15:34 )             25.6   11-25    138  |  106  |  4[L]  ----------------------------<  156[H]  3.3[L]   |  17[L]  |  0.45[L]    Ca    8.2[L]      25 Nov 2024 15:34    TPro  5.4[L]  /  Alb  2.8[L]  /  TBili  0.8  /  DBili  x   /  AST  19  /  ALT  11  /  AlkPhos  169[H]  11-25    Intermittent Tachypnea  Plan  Rule out DKA  DKA labs  Endocrine consulted, Discussed case with Endo Fellow, low suspicion for DKA at this time, Sugar 163, Beta hydroxy-butyrate 2.3  Replete Potassium - 40mEq PO x2 doses, Q4  Bedside Chest Xray pending  Stat Echo, Cardiology consult   EKG Sinus Tachycardia   Incentive spirometer  Lower extremity dopplers rule out DVT  Repeat CBC & CMP @730p    Discussed w Dr. Lux Carter PA

## 2024-11-25 NOTE — OB PROVIDER DELIVERY SUMMARY - NSPROVIDERDELIVERYNOTE_OBGYN_ALL_OB_FT
Viable female infant, wgt 3810 gms, delivered @1321  Cephalic presentation, clear copious fluid  loose nuchal x 1  hysterotomy closed in single layer  Right sided hysterotomy extension repaired  Intra uterine Hemabate x2  2g TXA given intraoperatively  Vistaseal placed over hysterotomy    otherwise grossly nl uterus, tubes & ovaries    QBL: 1718  IVF: 2000  UOP: 500    Dictation Number:    Postpartum Plan  Epidural to stay in situ until pt cleared to postpartum floor  Stat labs  Zosyn x24 hours  Endocrine reccs appreciated for postpartum T2DM management   Otherwise, Routine Care Viable female infant, wgt 3810 gms, delivered @1321  Cephalic presentation, clear copious fluid  loose nuchal x 1  hysterotomy closed in single layer  Right sided hysterotomy extension repaired  Intra uterine Hemabate x2  2g TXA given intraoperatively  Vistaseal placed over hysterotomy    otherwise grossly nl uterus, tubes & ovaries    QBL: 1718  IVF: 2000  UOP: 500    Dictation Number:    Postpartum Plan  Epidural to stay in situ until pt cleared to postpartum floor  Stat labs  Zosyn x24 hours  Low dose Insulin sliding scale, Endocrine reccs appreciated for further postpartum T2DM management   Otherwise, Routine Care Viable female infant, wgt 3810 gms, delivered @1321  Cephalic presentation, clear copious fluid  loose nuchal x 1  hysterotomy closed in single layer  Right sided hysterotomy extension repaired  Intra uterine Hemabate x2  2g TXA given intraoperatively  Vistaseal placed over hysterotomy  Pt noted to be tachypneic in the OR     otherwise grossly nl uterus, tubes & ovaries    QBL: 1718  IVF: 2000  UOP: 500    Dictation Number:    Postpartum Plan  Epidural to stay in situ until pt cleared to postpartum floor  Zosyn x24 hours  Low dose Insulin sliding scale, Endocrine reccs appreciated for further postpartum T2DM management   EKG, CXR, labs  Incentive spirometer  Otherwise, Routine Care Viable female infant, wgt 3810 gms, delivered @1321  Cephalic presentation, clear copious fluid  loose nuchal x 1  hysterotomy closed in single layer  Right sided hysterotomy extension repaired  Intra uterine Hemabate x2  2g TXA given intraoperatively  Vistaseal placed over hysterotomy  Pt noted to be tachypneic in the OR     otherwise grossly nl uterus, tubes & ovaries    QBL: 1718  IVF: 2000  UOP: 500    Dictation Number: [id number]    Postpartum Plan  Epidural to stay in situ until pt cleared to postpartum floor  Zosyn x24 hours  Low dose Insulin sliding scale, Endocrine reccs appreciated for further postpartum T2DM management   EKG, CXR, labs  Incentive spirometer  Otherwise, Routine Care

## 2024-11-25 NOTE — CHART NOTE - NSCHARTNOTEFT_GEN_A_CORE
At this time, patient meeting criteria for Endometritis as we do not have fetal tachycardia present at this time    T(C): 38.2 (11-25-24 @ 10:06), Max: 38.2 (11-25-24 @ 10:06)  T(F): 100.76 (11-25-24 @ 10:06), Max: 100.76 (11-25-24 @ 10:06)  HR: 113 (11-25-24 @ 11:06) (88 - 181)  BP: 124/59 (11-25-24 @ 09:58) (102/57 - 154/62)  RR: 17 (11-25-24 @ 09:59) (15 - 18)  SpO2: 99% (11-25-24 @ 11:06) (84% - 100%)  Wt(kg): --      LABS:  cret                        10.2   7.31  )-----------( 216      ( 23 Nov 2024 19:50 )             30.5     11-23    138  |  104  |  6[L]  ----------------------------<  113[H]  3.4[L]   |  21[L]  |  0.45[L]    Ca    9.1      23 Nov 2024 19:50    TPro  6.8  /  Alb  3.6  /  TBili  0.4  /  DBili  x   /  AST  21  /  ALT  12  /  AlkPhos  188[H]  11-23    ---------------------------------------------------------------------------------------------------------------      Maternal rectal temp 38.2c in 2 different occassions, maternal tachycardia noted      PLAN    1. pt s/p LR 300ml bolus    2. CBC sent now, pending results    3. Ofirmev 1g ordered    4. Treat with IV Zosyn, ordered    5. continue to monitor      Discussed with MD Lux Almazan

## 2024-11-26 DIAGNOSIS — E11.9 TYPE 2 DIABETES MELLITUS WITHOUT COMPLICATIONS: ICD-10-CM

## 2024-11-26 DIAGNOSIS — E66.9 OBESITY, UNSPECIFIED: ICD-10-CM

## 2024-11-26 LAB
A1C WITH ESTIMATED AVERAGE GLUCOSE RESULT: 5.5 % — SIGNIFICANT CHANGE UP (ref 4–5.6)
ANION GAP SERPL CALC-SCNC: 13 MMOL/L — SIGNIFICANT CHANGE UP (ref 7–14)
B-OH-BUTYR SERPL-SCNC: 0.4 MMOL/L — SIGNIFICANT CHANGE UP (ref 0–0.4)
BASOPHILS # BLD AUTO: 0.04 K/UL — SIGNIFICANT CHANGE UP (ref 0–0.2)
BASOPHILS NFR BLD AUTO: 0.3 % — SIGNIFICANT CHANGE UP (ref 0–2)
BLOOD GAS VENOUS COMPREHENSIVE RESULT: SIGNIFICANT CHANGE UP
BUN SERPL-MCNC: 6 MG/DL — LOW (ref 7–23)
CALCIUM SERPL-MCNC: 8.1 MG/DL — LOW (ref 8.4–10.5)
CHLORIDE SERPL-SCNC: 104 MMOL/L — SIGNIFICANT CHANGE UP (ref 98–107)
CO2 SERPL-SCNC: 19 MMOL/L — LOW (ref 22–31)
CREAT SERPL-MCNC: 0.6 MG/DL — SIGNIFICANT CHANGE UP (ref 0.5–1.3)
EGFR: 129 ML/MIN/1.73M2 — SIGNIFICANT CHANGE UP
EOSINOPHIL # BLD AUTO: 0 K/UL — SIGNIFICANT CHANGE UP (ref 0–0.5)
EOSINOPHIL NFR BLD AUTO: 0 % — SIGNIFICANT CHANGE UP (ref 0–6)
ESTIMATED AVERAGE GLUCOSE: 111 — SIGNIFICANT CHANGE UP
GLUCOSE BLDC GLUCOMTR-MCNC: 117 MG/DL — HIGH (ref 70–99)
GLUCOSE BLDC GLUCOMTR-MCNC: 150 MG/DL — HIGH (ref 70–99)
GLUCOSE BLDC GLUCOMTR-MCNC: 152 MG/DL — HIGH (ref 70–99)
GLUCOSE BLDC GLUCOMTR-MCNC: 156 MG/DL — HIGH (ref 70–99)
GLUCOSE SERPL-MCNC: 108 MG/DL — HIGH (ref 70–99)
HCT VFR BLD CALC: 21.4 % — LOW (ref 34.5–45)
HGB BLD-MCNC: 7.1 G/DL — LOW (ref 11.5–15.5)
IANC: 10.32 K/UL — HIGH (ref 1.8–7.4)
IMM GRANULOCYTES NFR BLD AUTO: 0.6 % — SIGNIFICANT CHANGE UP (ref 0–0.9)
LYMPHOCYTES # BLD AUTO: 1.88 K/UL — SIGNIFICANT CHANGE UP (ref 1–3.3)
LYMPHOCYTES # BLD AUTO: 14 % — SIGNIFICANT CHANGE UP (ref 13–44)
MCHC RBC-ENTMCNC: 28.7 PG — SIGNIFICANT CHANGE UP (ref 27–34)
MCHC RBC-ENTMCNC: 33.2 G/DL — SIGNIFICANT CHANGE UP (ref 32–36)
MCV RBC AUTO: 86.6 FL — SIGNIFICANT CHANGE UP (ref 80–100)
MONOCYTES # BLD AUTO: 1.14 K/UL — HIGH (ref 0–0.9)
MONOCYTES NFR BLD AUTO: 8.5 % — SIGNIFICANT CHANGE UP (ref 2–14)
NEUTROPHILS # BLD AUTO: 10.32 K/UL — HIGH (ref 1.8–7.4)
NEUTROPHILS NFR BLD AUTO: 76.6 % — SIGNIFICANT CHANGE UP (ref 43–77)
NRBC # BLD: 0 /100 WBCS — SIGNIFICANT CHANGE UP (ref 0–0)
NRBC # FLD: 0 K/UL — SIGNIFICANT CHANGE UP (ref 0–0)
PLATELET # BLD AUTO: 211 K/UL — SIGNIFICANT CHANGE UP (ref 150–400)
POTASSIUM SERPL-MCNC: 3.8 MMOL/L — SIGNIFICANT CHANGE UP (ref 3.5–5.3)
POTASSIUM SERPL-SCNC: 3.8 MMOL/L — SIGNIFICANT CHANGE UP (ref 3.5–5.3)
RBC # BLD: 2.47 M/UL — LOW (ref 3.8–5.2)
RBC # FLD: 14.2 % — SIGNIFICANT CHANGE UP (ref 10.3–14.5)
SODIUM SERPL-SCNC: 136 MMOL/L — SIGNIFICANT CHANGE UP (ref 135–145)
WBC # BLD: 13.46 K/UL — HIGH (ref 3.8–10.5)
WBC # FLD AUTO: 13.46 K/UL — HIGH (ref 3.8–10.5)

## 2024-11-26 PROCEDURE — 99254 IP/OBS CNSLTJ NEW/EST MOD 60: CPT

## 2024-11-26 RX ORDER — ACETAMINOPHEN 500MG 500 MG/1
1000 TABLET, COATED ORAL ONCE
Refills: 0 | Status: COMPLETED | OUTPATIENT
Start: 2024-11-26 | End: 2024-11-26

## 2024-11-26 RX ORDER — IBUPROFEN 200 MG
600 TABLET ORAL EVERY 6 HOURS
Refills: 0 | Status: DISCONTINUED | OUTPATIENT
Start: 2024-11-26 | End: 2024-11-28

## 2024-11-26 RX ORDER — DIPHENHYDRAMINE HCL 25 MG
25 CAPSULE ORAL ONCE
Refills: 0 | Status: COMPLETED | OUTPATIENT
Start: 2024-11-26 | End: 2024-11-26

## 2024-11-26 RX ADMIN — Medication 600 MILLIGRAM(S): at 17:50

## 2024-11-26 RX ADMIN — KETOROLAC TROMETHAMINE 30 MILLIGRAM(S): 30 INJECTION INTRAMUSCULAR; INTRAVENOUS at 05:52

## 2024-11-26 RX ADMIN — KETOROLAC TROMETHAMINE 30 MILLIGRAM(S): 30 INJECTION INTRAMUSCULAR; INTRAVENOUS at 00:50

## 2024-11-26 RX ADMIN — Medication 10000 UNIT(S): at 09:41

## 2024-11-26 RX ADMIN — KETOROLAC TROMETHAMINE 30 MILLIGRAM(S): 30 INJECTION INTRAMUSCULAR; INTRAVENOUS at 06:31

## 2024-11-26 RX ADMIN — ACETAMINOPHEN 500MG 975 MILLIGRAM(S): 500 TABLET, COATED ORAL at 04:55

## 2024-11-26 RX ADMIN — ACETAMINOPHEN 500MG 975 MILLIGRAM(S): 500 TABLET, COATED ORAL at 23:02

## 2024-11-26 RX ADMIN — ACETAMINOPHEN 500MG 975 MILLIGRAM(S): 500 TABLET, COATED ORAL at 09:40

## 2024-11-26 RX ADMIN — ACETAMINOPHEN 500MG 975 MILLIGRAM(S): 500 TABLET, COATED ORAL at 10:10

## 2024-11-26 RX ADMIN — Medication 25 MILLIGRAM(S): at 16:46

## 2024-11-26 RX ADMIN — POTASSIUM CHLORIDE 40 MILLIEQUIVALENT(S): 600 TABLET, EXTENDED RELEASE ORAL at 03:56

## 2024-11-26 RX ADMIN — PIPERACILLIN SODIUM AND TAZOBACTAM SODIUM 200 GRAM(S): 4; .5 INJECTION, POWDER, LYOPHILIZED, FOR SOLUTION INTRAVENOUS at 03:55

## 2024-11-26 RX ADMIN — ACETAMINOPHEN 500MG 975 MILLIGRAM(S): 500 TABLET, COATED ORAL at 03:55

## 2024-11-26 RX ADMIN — Medication 10000 UNIT(S): at 22:02

## 2024-11-26 RX ADMIN — ACETAMINOPHEN 500MG 400 MILLIGRAM(S): 500 TABLET, COATED ORAL at 16:45

## 2024-11-26 RX ADMIN — ACETAMINOPHEN 500MG 975 MILLIGRAM(S): 500 TABLET, COATED ORAL at 22:02

## 2024-11-26 RX ADMIN — Medication 500 MILLIGRAM(S): at 17:21

## 2024-11-26 RX ADMIN — Medication 1: at 12:05

## 2024-11-26 RX ADMIN — Medication 600 MILLIGRAM(S): at 17:21

## 2024-11-26 NOTE — CHART NOTE - NSCHARTNOTEFT_GEN_A_CORE
Pt seen and examined at bedside after provider made aware that patient had episodes of tachycardia to 140s while ambulating.    At bedside, patient was resting comfortably in bed with family in the room and HR noted to be in the 120s. Patient declined symptoms of palpitations, shortness of breath, headache, n/v, chest pain.  On exam, patient blood pressures, O2 saturation wnl.     Pulm: breathing comfortably on room air  Heart: RRR  Abd: appropriately tender  Lochia: wnl     Complete Blood Count + Automated Diff in AM (11.26.24 @ 06:30)   Nucleated RBC #: 0.00 K/uL  IANC: 10.32: IANC (instrument absolute neutrophil count) is based on the instrument   calculation which may differ from ANC (manual absolute neutrophil count)   since it is based on the calculation from a manual differential. K/uL  WBC Count: 13.46 K/uL  RBC Count: 2.47 M/uL  Hemoglobin: 7.1 g/dL  Hematocrit: 21.4 %  Mean Cell Volume: 86.6 fL  Mean Cell Hemoglobin: 28.7 pg  Mean Cell Hemoglobin Conc: 33.2 g/dL  Red Cell Distrib Width: 14.2 %  Platelet Count - Automated: 211 K/uL  Auto Neutrophil #: 10.32 K/uL  Auto Lymphocyte #: 1.88 K/uL  Auto Monocyte #: 1.14 K/uL  Auto Eosinophil #: 0.00 K/uL  Auto Basophil #: 0.04 K/uL  Auto Neutrophil %: 76.6: Differential percentages must be correlated with absolute numbers for   clinical significance. %  Auto Lymphocyte %: 14.0 %  Auto Monocyte %: 8.5 %  Auto Eosinophil %: 0.0 %  Auto Basophil %: 0.3 %  Auto Immature Granulocyte %: 0.6: (Includes meta, myelo and promyelocytes). Mild elevations in immature   granulocytes may be seen with many inflammatory processes and pregnancy;   clinical correlation suggested. %  Nucleated RBC: 0 /100 WBCs    A/P: 23 yo P1 POD1 from pLTCS for failed induction (EBL 1718 s/p Hemabate and TXA x2). Patient currently on telemetry given episode of marked tachypnea in PACU on POD0. Patient noted to be tachycardic to 120s while resting. Pt H&H from this morning revealed to be 7.1/21.4.     #Symptomatic anemia   - 2 uPRBCs  - AM CBC   - cont to monitor for symptoms    #tachypnea  - s/p endo consult: low suspicion for DKA  - cont to monitor RR and O2 sat  - s/p dopplers and CXR, both wnl  - F/u echo    D/w Dr. Fabian Waggoner PGY1 Pt seen and examined at bedside after provider made aware that patient had episodes of tachycardia to 140s while ambulating.    At bedside, patient was resting comfortably in bed with family in the room and HR noted to be in the 120s. Patient declined symptoms of palpitations, shortness of breath, headache, n/v, chest pain.  On exam, patient blood pressures, O2 saturation wnl.     Pulm: breathing comfortably on room air  Heart: RRR  Abd: appropriately tender  Lochia: wnl     Complete Blood Count + Automated Diff in AM (11.26.24 @ 06:30)   Nucleated RBC #: 0.00 K/uL  IANC: 10.32: IANC (instrument absolute neutrophil count) is based on the instrument   calculation which may differ from ANC (manual absolute neutrophil count)   since it is based on the calculation from a manual differential. K/uL  WBC Count: 13.46 K/uL  RBC Count: 2.47 M/uL  Hemoglobin: 7.1 g/dL  Hematocrit: 21.4 %  Mean Cell Volume: 86.6 fL  Mean Cell Hemoglobin: 28.7 pg  Mean Cell Hemoglobin Conc: 33.2 g/dL  Red Cell Distrib Width: 14.2 %  Platelet Count - Automated: 211 K/uL  Auto Neutrophil #: 10.32 K/uL  Auto Lymphocyte #: 1.88 K/uL  Auto Monocyte #: 1.14 K/uL  Auto Eosinophil #: 0.00 K/uL  Auto Basophil #: 0.04 K/uL  Auto Neutrophil %: 76.6: Differential percentages must be correlated with absolute numbers for   clinical significance. %  Auto Lymphocyte %: 14.0 %  Auto Monocyte %: 8.5 %  Auto Eosinophil %: 0.0 %  Auto Basophil %: 0.3 %  Auto Immature Granulocyte %: 0.6: (Includes meta, myelo and promyelocytes). Mild elevations in immature   granulocytes may be seen with many inflammatory processes and pregnancy;   clinical correlation suggested. %  Nucleated RBC: 0 /100 WBCs    A/P: 25 yo P1 POD1 from pLTCS for failed induction (EBL 1718 s/p Hemabate and TXA x2). Patient currently on telemetry given episode of marked tachypnea in PACU on POD0. Patient noted to be tachycardic to 120s while resting. Pt H&H from this morning revealed to be 7.1/21.4.     #Symptomatic anemia   - 2 uPRBCs  - AM CBC   - cont to monitor for symptoms    #tachypnea  - s/p endo consult: low suspicion for DKA  - cont to monitor RR and O2 sat  - s/p dopplers and CXR, both wnl  - F/u echo    D/w Dr. Fabian Waggoner PGY1        patient seen and examined  Noted to be tired but just received IV benadryl for PRBC transfusion  agree with above- still awaiting TTE  repeat CBC after second unit and continue to monitor closely

## 2024-11-26 NOTE — PROVIDER CONTACT NOTE (OTHER) - ASSESSMENT
pt states she has the feeling to void but has sat on toilet and was unable to void
1530 VS: HR- 95 bpm  BP-119/71 RR-47 O2 sat- 100%. 37.0 C. A&O x4. pt denies SOB, dizziness, difficulty breathing. Lung sounds B/L clear and equal.
pt denies any chest pain, palpitations, or SOB. pt tolerating pain management well

## 2024-11-26 NOTE — PROVIDER CONTACT NOTE (OTHER) - SITUATION
pt is tachycardic to the 140s-150s upon ambulation to and from bathroom. pt on continuous tele
pt now 8hrs s/p kim removal and has not voided
pt postop primary c/s @ 1221 due to arrest/chorio. PACU in @ 1500. pt noted to be tachypneic to the 40s. Fast shallow breaths noted while pt is sleeping. pt denies SOB/dyspnea.

## 2024-11-26 NOTE — PROVIDER CONTACT NOTE (OTHER) - ACTION/TREATMENT ORDERED:
2 Units of PRBCs ordered for pt along w/ pre medication IVPB Tylenol and IVP benadryl. AM CBC to be drawn in the morning as per MD Waggoner
OLGA tsai at bedside. CBC, CMP, Coags and lactate to be drawn at this time.
to have pt try urinating again on toilet and reassess pt as per MD Waggoner

## 2024-11-26 NOTE — PROVIDER CONTACT NOTE (OTHER) - RECOMMENDATIONS
pt bladder scanned and there was 52mL in bladder. Unsure if accurate d/t scanning around acquacel dressing. MD Edilson santacruz

## 2024-11-26 NOTE — PROGRESS NOTE ADULT - SUBJECTIVE AND OBJECTIVE BOX
INTERVAL HPI/OVERNIGHT EVENTS:  24y Female s/p c section under epi anesthesia with duramorph for post op analgesia     Vital Signs Last 24 Hrs  T(C): 36.9 (26 Nov 2024 06:26), Max: 38.2 (25 Nov 2024 10:06)  T(F): 98.5 (26 Nov 2024 06:26), Max: 100.76 (25 Nov 2024 10:06)  HR: 110 (26 Nov 2024 06:26) (95 - 115)  BP: 106/55 (26 Nov 2024 06:26) (89/67 - 131/76)  BP(mean): 90 (25 Nov 2024 18:00) (76 - 95)  RR: 19 (26 Nov 2024 06:26) (15 - 49)  SpO2: 98% (26 Nov 2024 06:26) (93% - 100%)    Parameters below as of 26 Nov 2024 06:26  Patient On (Oxygen Delivery Method): room air      Patient's overall anesthesia satisfaction: Positive    Patients pain is well controlled with IT duramorph    No respiratory events overnight    No pruritis at this time    Patient doing well     No headache      No residual numbness or weakness, sensory and motor function intact.    No anesthetic complications or complaints noted or reported          .

## 2024-11-26 NOTE — PROVIDER CONTACT NOTE (OTHER) - BACKGROUND
pp c/s 11/25 @1321. QBL = 1718. H/H on 11/26 = 7.1/21.4 pt tachycardic and tachypneic in PACU, s/p EKG and Chest Xray.
pp c/s @ 38wks 11/25 @ 7864. hx of T2DM and GHTN
23 yo pt ; 38 wks. admit for IOL gHTN. Type 2 diabetic. hx of anxiety. met criteria for chorio @ 1006  for temp 38.2 & elevated WBC. pt tachycardic baseline low 100s. BMI 45.2.

## 2024-11-27 ENCOUNTER — RESULT REVIEW (OUTPATIENT)
Age: 24
End: 2024-11-27

## 2024-11-27 LAB
ALBUMIN SERPL ELPH-MCNC: 3.1 G/DL — LOW (ref 3.3–5)
ALP SERPL-CCNC: 143 U/L — HIGH (ref 40–120)
ALT FLD-CCNC: 11 U/L — SIGNIFICANT CHANGE UP (ref 4–33)
ANION GAP SERPL CALC-SCNC: 15 MMOL/L — HIGH (ref 7–14)
AST SERPL-CCNC: 17 U/L — SIGNIFICANT CHANGE UP (ref 4–32)
BASOPHILS # BLD AUTO: 0.03 K/UL — SIGNIFICANT CHANGE UP (ref 0–0.2)
BASOPHILS # BLD AUTO: 0.04 K/UL — SIGNIFICANT CHANGE UP (ref 0–0.2)
BASOPHILS NFR BLD AUTO: 0.3 % — SIGNIFICANT CHANGE UP (ref 0–2)
BASOPHILS NFR BLD AUTO: 0.4 % — SIGNIFICANT CHANGE UP (ref 0–2)
BILIRUB SERPL-MCNC: 0.6 MG/DL — SIGNIFICANT CHANGE UP (ref 0.2–1.2)
BLD GP AB SCN SERPL QL: NEGATIVE — SIGNIFICANT CHANGE UP
BUN SERPL-MCNC: 9 MG/DL — SIGNIFICANT CHANGE UP (ref 7–23)
CALCIUM SERPL-MCNC: 8.7 MG/DL — SIGNIFICANT CHANGE UP (ref 8.4–10.5)
CHLORIDE SERPL-SCNC: 106 MMOL/L — SIGNIFICANT CHANGE UP (ref 98–107)
CO2 SERPL-SCNC: 20 MMOL/L — LOW (ref 22–31)
CREAT SERPL-MCNC: 0.5 MG/DL — SIGNIFICANT CHANGE UP (ref 0.5–1.3)
EGFR: 135 ML/MIN/1.73M2 — SIGNIFICANT CHANGE UP
EOSINOPHIL # BLD AUTO: 0.06 K/UL — SIGNIFICANT CHANGE UP (ref 0–0.5)
EOSINOPHIL # BLD AUTO: 0.1 K/UL — SIGNIFICANT CHANGE UP (ref 0–0.5)
EOSINOPHIL NFR BLD AUTO: 0.5 % — SIGNIFICANT CHANGE UP (ref 0–6)
EOSINOPHIL NFR BLD AUTO: 0.9 % — SIGNIFICANT CHANGE UP (ref 0–6)
GLUCOSE BLDC GLUCOMTR-MCNC: 105 MG/DL — HIGH (ref 70–99)
GLUCOSE BLDC GLUCOMTR-MCNC: 110 MG/DL — HIGH (ref 70–99)
GLUCOSE BLDC GLUCOMTR-MCNC: 115 MG/DL — HIGH (ref 70–99)
GLUCOSE BLDC GLUCOMTR-MCNC: 128 MG/DL — HIGH (ref 70–99)
GLUCOSE SERPL-MCNC: 153 MG/DL — HIGH (ref 70–99)
HCT VFR BLD CALC: 24.3 % — LOW (ref 34.5–45)
HCT VFR BLD CALC: 24.7 % — LOW (ref 34.5–45)
HCT VFR BLD CALC: 25 % — LOW (ref 34.5–45)
HGB BLD-MCNC: 8 G/DL — LOW (ref 11.5–15.5)
HGB BLD-MCNC: 8.3 G/DL — LOW (ref 11.5–15.5)
HGB BLD-MCNC: 8.3 G/DL — LOW (ref 11.5–15.5)
IANC: 7.78 K/UL — HIGH (ref 1.8–7.4)
IANC: 8.04 K/UL — HIGH (ref 1.8–7.4)
IMM GRANULOCYTES NFR BLD AUTO: 0.7 % — SIGNIFICANT CHANGE UP (ref 0–0.9)
IMM GRANULOCYTES NFR BLD AUTO: 1 % — HIGH (ref 0–0.9)
LYMPHOCYTES # BLD AUTO: 2.19 K/UL — SIGNIFICANT CHANGE UP (ref 1–3.3)
LYMPHOCYTES # BLD AUTO: 2.35 K/UL — SIGNIFICANT CHANGE UP (ref 1–3.3)
LYMPHOCYTES # BLD AUTO: 20 % — SIGNIFICANT CHANGE UP (ref 13–44)
LYMPHOCYTES # BLD AUTO: 20.7 % — SIGNIFICANT CHANGE UP (ref 13–44)
MCHC RBC-ENTMCNC: 28.7 PG — SIGNIFICANT CHANGE UP (ref 27–34)
MCHC RBC-ENTMCNC: 28.9 PG — SIGNIFICANT CHANGE UP (ref 27–34)
MCHC RBC-ENTMCNC: 28.9 PG — SIGNIFICANT CHANGE UP (ref 27–34)
MCHC RBC-ENTMCNC: 32.9 G/DL — SIGNIFICANT CHANGE UP (ref 32–36)
MCHC RBC-ENTMCNC: 33.2 G/DL — SIGNIFICANT CHANGE UP (ref 32–36)
MCHC RBC-ENTMCNC: 33.6 G/DL — SIGNIFICANT CHANGE UP (ref 32–36)
MCV RBC AUTO: 86.1 FL — SIGNIFICANT CHANGE UP (ref 80–100)
MCV RBC AUTO: 87.1 FL — SIGNIFICANT CHANGE UP (ref 80–100)
MCV RBC AUTO: 87.1 FL — SIGNIFICANT CHANGE UP (ref 80–100)
MONOCYTES # BLD AUTO: 0.73 K/UL — SIGNIFICANT CHANGE UP (ref 0–0.9)
MONOCYTES # BLD AUTO: 0.76 K/UL — SIGNIFICANT CHANGE UP (ref 0–0.9)
MONOCYTES NFR BLD AUTO: 6.7 % — SIGNIFICANT CHANGE UP (ref 2–14)
MONOCYTES NFR BLD AUTO: 6.7 % — SIGNIFICANT CHANGE UP (ref 2–14)
NEUTROPHILS # BLD AUTO: 7.78 K/UL — HIGH (ref 1.8–7.4)
NEUTROPHILS # BLD AUTO: 8.04 K/UL — HIGH (ref 1.8–7.4)
NEUTROPHILS NFR BLD AUTO: 71 % — SIGNIFICANT CHANGE UP (ref 43–77)
NEUTROPHILS NFR BLD AUTO: 71.1 % — SIGNIFICANT CHANGE UP (ref 43–77)
NRBC # BLD: 0 /100 WBCS — SIGNIFICANT CHANGE UP (ref 0–0)
NRBC # FLD: 0 K/UL — SIGNIFICANT CHANGE UP (ref 0–0)
PLATELET # BLD AUTO: 204 K/UL — SIGNIFICANT CHANGE UP (ref 150–400)
PLATELET # BLD AUTO: 208 K/UL — SIGNIFICANT CHANGE UP (ref 150–400)
PLATELET # BLD AUTO: 217 K/UL — SIGNIFICANT CHANGE UP (ref 150–400)
POTASSIUM SERPL-MCNC: 3.7 MMOL/L — SIGNIFICANT CHANGE UP (ref 3.5–5.3)
POTASSIUM SERPL-SCNC: 3.7 MMOL/L — SIGNIFICANT CHANGE UP (ref 3.5–5.3)
PROT SERPL-MCNC: 6.2 G/DL — SIGNIFICANT CHANGE UP (ref 6–8.3)
RBC # BLD: 2.79 M/UL — LOW (ref 3.8–5.2)
RBC # BLD: 2.87 M/UL — LOW (ref 3.8–5.2)
RBC # BLD: 2.87 M/UL — LOW (ref 3.8–5.2)
RBC # FLD: 14.3 % — SIGNIFICANT CHANGE UP (ref 10.3–14.5)
RBC # FLD: 14.4 % — SIGNIFICANT CHANGE UP (ref 10.3–14.5)
RBC # FLD: 14.6 % — HIGH (ref 10.3–14.5)
RH IG SCN BLD-IMP: POSITIVE — SIGNIFICANT CHANGE UP
SODIUM SERPL-SCNC: 141 MMOL/L — SIGNIFICANT CHANGE UP (ref 135–145)
T3 SERPL-MCNC: 225 NG/DL — HIGH (ref 80–200)
T4 AB SER-ACNC: 11.03 UG/DL — SIGNIFICANT CHANGE UP (ref 5.1–13)
T4 FREE SERPL-MCNC: 0.9 NG/DL — SIGNIFICANT CHANGE UP (ref 0.9–1.8)
TSH SERPL-MCNC: 4.05 UIU/ML — SIGNIFICANT CHANGE UP (ref 0.27–4.2)
TSH SERPL-MCNC: 4.07 UIU/ML — SIGNIFICANT CHANGE UP (ref 0.27–4.2)
WBC # BLD: 10.94 K/UL — HIGH (ref 3.8–10.5)
WBC # BLD: 11.33 K/UL — HIGH (ref 3.8–10.5)
WBC # BLD: 11.45 K/UL — HIGH (ref 3.8–10.5)
WBC # FLD AUTO: 10.94 K/UL — HIGH (ref 3.8–10.5)
WBC # FLD AUTO: 11.33 K/UL — HIGH (ref 3.8–10.5)
WBC # FLD AUTO: 11.45 K/UL — HIGH (ref 3.8–10.5)

## 2024-11-27 PROCEDURE — 99222 1ST HOSP IP/OBS MODERATE 55: CPT | Mod: GC

## 2024-11-27 PROCEDURE — 93306 TTE W/DOPPLER COMPLETE: CPT | Mod: 26

## 2024-11-27 PROCEDURE — 74174 CTA ABD&PLVS W/CONTRAST: CPT | Mod: 26

## 2024-11-27 RX ADMIN — ACETAMINOPHEN 500MG 975 MILLIGRAM(S): 500 TABLET, COATED ORAL at 04:02

## 2024-11-27 RX ADMIN — ACETAMINOPHEN 500MG 975 MILLIGRAM(S): 500 TABLET, COATED ORAL at 13:40

## 2024-11-27 RX ADMIN — Medication 600 MILLIGRAM(S): at 08:00

## 2024-11-27 RX ADMIN — Medication 600 MILLIGRAM(S): at 01:13

## 2024-11-27 RX ADMIN — ACETAMINOPHEN 500MG 975 MILLIGRAM(S): 500 TABLET, COATED ORAL at 23:08

## 2024-11-27 RX ADMIN — Medication 600 MILLIGRAM(S): at 06:47

## 2024-11-27 RX ADMIN — Medication 10000 UNIT(S): at 23:08

## 2024-11-27 RX ADMIN — ACETAMINOPHEN 500MG 975 MILLIGRAM(S): 500 TABLET, COATED ORAL at 05:02

## 2024-11-27 RX ADMIN — ACETAMINOPHEN 500MG 975 MILLIGRAM(S): 500 TABLET, COATED ORAL at 13:11

## 2024-11-27 RX ADMIN — Medication 10000 UNIT(S): at 13:12

## 2024-11-27 RX ADMIN — Medication 600 MILLIGRAM(S): at 02:13

## 2024-11-27 RX ADMIN — Medication 500 MILLIGRAM(S): at 17:51

## 2024-11-27 NOTE — CHART NOTE - NSCHARTNOTESELECT_GEN_ALL_CORE
Cardiology consult request
Endocrinology/Event Note
Intrapartum
Tachypnea/Event Note
isolated maternal temp
Endometritis Dx
Tachycardic
Event Note

## 2024-11-27 NOTE — CHART NOTE - NSCHARTNOTEFT_GEN_A_CORE
Cardiology consult requested    Initially had messaged thru TEAMS with MD Lei from [facility], she responded that she is off today and instructed to reach out to below MD/      Spoke to Cardiology MD ----Dasia Spencer MD  on 11/27/24 @ 1111am--spectra [phone]    He is from Cardiology Department, made aware of the consult,     Patient has now had the TTE and CTA this morning and is back in pp room.        PLAN    1. Cards consult requested and spoke to above MD, He will await results of TTE and CTA results before he sees/evaluates patient    2. TSH labs and repeat CBC were ordered and to be drawn now as patient was not in room earlier     3. continue to monitor        MD Gonzales to see patient         Discussed with MD Gonzales    Discussed with MILTON Almazan

## 2024-11-27 NOTE — CONSULT NOTE ADULT - ASSESSMENT
24y  at 37w4d PMH of T2DM, gHTN, known history of sinus tachycardia (follows with Dr. Crow) admitted to OB for IOL now s/p  . Cardiology consulted for tachycardia. Patient has multiple reasons for tachycardia including anxiety, pain, fever, blood loss. EKG demonstrating sinus tachycardia with normal axis, QTc 453. Tachycardia improved after volume resuscitation. Low suspicion for PE given known baseline sinus tachycardia, SpO2 100% on room air without SOB or SUE. TTE not concerning for PE.     #Sinus Tachycardia  -known baseline tachycardia to 110s, patient now back at baseline HR   -likely i/s/o volume loss vs sepsis vs anxiety   -low suspicion for PE/AFE given no evidence of hypoxia/symptoms, duplex studies negative   -please obtain TSH, blood cultures for further workup  -recommend medicine consult for further management as needed    No further cardiac workup necessary at this time. Please reconsult cardiology as needed.     Note not final until attested by attending  Nneka Harris PGY 3
24 year old woman with T2DM and obesity, now postpartum day 1 after , requiring high insulin doses during pregnancy    Recommend resuming metformin, start with 500 mg daily  Continue admelog correction scale  Continue consistent carbohydrate diet  Blood pressure at goal  She should follow up with her PCP as an outpt for further diabetes care including screening for microvascular disease, assessment of lipids, consideration of GLP1 in future.    Discussed with primary team    Bernarda Metcalf MD  On 24 : via Teams or pager    Other times:  Diabetes team: [phone]   or email [email or web address]

## 2024-11-27 NOTE — CHART NOTE - NSCHARTNOTEFT_GEN_A_CORE
Briefly,     Interval events: No acute events noted. Remains euglycemic      PAST MEDICAL & SURGICAL HISTORY:  Gestational diabetes mellitus, class A2            No Known Allergies    Home Medications:     MEDICATIONS  (STANDING):  acetaminophen     Tablet .. 975 milliGRAM(s) Oral <User Schedule>  dextrose 5%. 1000 milliLiter(s) (100 mL/Hr) IV Continuous <Continuous>  dextrose 5%. 1000 milliLiter(s) (50 mL/Hr) IV Continuous <Continuous>  dextrose 50% Injectable 25 Gram(s) IV Push once  dextrose 50% Injectable 12.5 Gram(s) IV Push once  dextrose 50% Injectable 25 Gram(s) IV Push once  diphtheria/tetanus/pertussis (acellular) Vaccine (Adacel) 0.5 milliLiter(s) IntraMuscular once  glucagon  Injectable 1 milliGRAM(s) IntraMuscular once  heparin   Injectable 84207 Unit(s) SubCutaneous every 12 hours  ibuprofen  Tablet. 600 milliGRAM(s) Oral every 6 hours  influenza   Vaccine 0.5 milliLiter(s) IntraMuscular once  insulin lispro (ADMELOG) corrective regimen sliding scale   SubCutaneous three times a day before meals  insulin lispro (ADMELOG) corrective regimen sliding scale   SubCutaneous at bedtime  lactated ringers. 1000 milliLiter(s) (125 mL/Hr) IV Continuous <Continuous>  lactated ringers. 1000 milliLiter(s) (83 mL/Hr) IV Continuous <Continuous>  metFORMIN 500 milliGRAM(s) Oral daily  oxytocin Infusion 42 milliUNIT(s)/Min (42 mL/Hr) IV Continuous <Continuous>    MEDICATIONS  (PRN):  dextrose Oral Gel 15 Gram(s) Oral once PRN Blood Glucose LESS THAN 70 milliGRAM(s)/deciliter  diphenhydrAMINE 25 milliGRAM(s) Oral every 6 hours PRN Pruritus  lanolin Ointment 1 Application(s) Topical every 6 hours PRN Sore Nipples  magnesium hydroxide Suspension 30 milliLiter(s) Oral two times a day PRN Constipation  oxyCODONE    IR 5 milliGRAM(s) Oral every 3 hours PRN Moderate to Severe Pain (4-10)  oxyCODONE    IR 5 milliGRAM(s) Oral once PRN Moderate to Severe Pain (4-10)  simethicone 80 milliGRAM(s) Chew every 4 hours PRN Gas        OBJECTIVE:     Vital Signs Last 24 Hrs  T(C): 36.7 (2024 11:25), Max: 37.7 (2024 16:55)  T(F): 98 (2024 11:), Max: 99.9 (2024 16:55)  HR: 98 (:) (98 - 133)  BP: 135/80 (:) (122/67 - 139/102)  BP(mean): --  RR: 16 (:) (16 - 21)  SpO2: 98% (:) (98% - 100%)    Parameters below as of 2024 11:25  Patient On (Oxygen Delivery Method): room air          I&O's Detail    2024 07:01  -  2024 07:00  --------------------------------------------------------  IN:    PRBCs (Packed Red Blood Cells): 300 mL  Total IN: 300 mL    OUT:    Voided (mL): 800 mL  Total OUT: 800 mL    Total NET: -500 mL          LABS:                        8.3    10.94 )-----------( 204      ( 2024 11:25 )             24.7     Hgb Trend: 8.3<--, 8.0<--, 8.3<--, 7.1<--, 8.2<--  1127    141  |  106  |  9   ----------------------------<  153[H]  3.7   |  20[L]  |  0.50    Ca    8.7      2024 01:28    TPro  6.2  /  Alb  3.1[L]  /  TBili  0.6  /  DBili  x   /  AST  17  /  ALT  11  /  AlkPhos  143[H]  11    Creatinine Trend: 0.50<--, 0.60<--, 0.47<--, 0.45<--, 0.45<--, 0.42<--  PT/INR - ( 2024 15:34 )   PT: 12.5 sec;   INR: 1.08 ratio         PTT - ( 2024 15:34 )  PTT:29.6 sec  Urinalysis Basic - ( 2024 01:28 )    Color: x / Appearance: x / SG: x / pH: x  Gluc: 153 mg/dL / Ketone: x  / Bili: x / Urobili: x   Blood: x / Protein: x / Nitrite: x   Leuk Esterase: x / RBC: x / WBC x   Sq Epi: x / Non Sq Epi: x / Bacteria: x    Assessment and Recommendation: 	  24 year old woman with T2DM and obesity, now postpartum day 1 after , requiring high insulin doses during pregnancy    Continue metformin, start with 500 mg daily  Continue admelog correction scale  Continue consistent carbohydrate diet  Blood pressure at goal  She should follow up with her PCP as an outpt for further diabetes care including screening for microvascular disease, assessment of lipids, consideration of GLP1 in future.      Austin Roy MD  via Teams  Diabetes team: [phone]   or email [email or web address]

## 2024-11-27 NOTE — CONSULT NOTE ADULT - ATTENDING COMMENTS
The patient was seen and examined with the Cardiology Consultation Teaching Service.   Partner and baby at bedside    She is a 24-year-old woman diabetes, gestational hypertension, and a history of sinus tachycardia who was admitted for induction of labor and is now s/p . We are consulted for concerns regarding sinus tachycardia.    The patient has no chest pain or dyspnea. No palpitations or dizziness.  She was able to ambulate without complaint.    The patient is followed in the outpatient setting by cardiology and is known to have borderline tachycardia at baseline. The patient developed tachycardia to the 140s following , but his seems to have improved after receiving a blood transfusion.    PMH/PSH:  Diabetes  Gestational hypertension  Sinus tachycardia    Comfortable-appearing woman in no acute distress  Alert and oriented  Afebrile  Vital signs stable  I/O net negative 0.5L  JVP is not elevated  Clear lungs  Normal heart sounds  Extremities are warm and perfused  No peripheral edema     Leukocytosis 10.9K  Normocytic anemia Hb 8.3  Metabolic acidosis 20, AG 15    Lactate 2  Normal TSH    ECG demonstrates sinus tachycardia without evidence of prior myocardial infarction or ongoing myocardial ischemia or injury    Echocardiography demonstrates grossly normal LV systolic function. Normal LA size, Mild MR. Estimated pulmonary pressures are borderline normal, estimated RA pressure is normal.    Impression and Recommendations:   24-year-old woman diabetes, gestational hypertension, and a history of sinus tachycardia who was admitted for induction of labor and is now s/p . We are consulted for concerns regarding sinus tachycardia.    The patient's heart rate has been chronically elevated (or borderline elevated) for some time. I think the exacerbation of her sinus tachycardia in the postoperative setting is most likely related to blood loss. She also had a fever overnight, and may be experiencing some postoperative pain, both of which could also be contributing.     Echocardiography was obtained out of an abundance of caution, and there are no gross abnormalities noted. We reassured the patient and explained the results of the ECG and echocardiography.     No treatment is necessary for sinus tachycardia in and of itself, but any additional underlying causes, such as infection or pain, should be addressed.     Please call cardiology with additional questions or concerns.     Mario Martinez MD [facility] FACP  Cardiology  [phone]

## 2024-11-27 NOTE — CONSULT NOTE ADULT - SUBJECTIVE AND OBJECTIVE BOX
HPI: 24y  with pre-existing T2DM and gestational HTN now postop day 1 after C section.  Prior to her pregnancy, she was taking metformin 500 mg 1-2 x daily ( had symptoms at higher doses), with A1c 6.7%.  She started insulin during pregnancy, and prior to admission was taking Lantus 20 units in AM and 92 in PM and novolog 66 units before brunch and 76 before dinner.  She reports that glucose values were mostly in range.  Diabetes was managed by her PCP before pregnancy and at the [facility] during pregnancy.  She reports not limiting carbohydrate intake, reports no htn prior to pregnancy.  No diabetes complications.        PAST MEDICAL & SURGICAL HISTORY:  Gestational diabetes mellitus, class A2      FAMILY HISTORY: NO first degree relatives with diabetes      Social History: No tobacco, etoh    Outpatient Medications:    as above and   PNV:  (15 Nov 2024 10:33)  Zuleyma Palm, Vit D       MEDICATIONS  (STANDING):  acetaminophen     Tablet .. 975 milliGRAM(s) Oral <User Schedule>  dextrose 5%. 1000 milliLiter(s) (100 mL/Hr) IV Continuous <Continuous>  dextrose 5%. 1000 milliLiter(s) (50 mL/Hr) IV Continuous <Continuous>  dextrose 50% Injectable 25 Gram(s) IV Push once  dextrose 50% Injectable 12.5 Gram(s) IV Push once  dextrose 50% Injectable 25 Gram(s) IV Push once  diphtheria/tetanus/pertussis (acellular) Vaccine (Adacel) 0.5 milliLiter(s) IntraMuscular once  glucagon  Injectable 1 milliGRAM(s) IntraMuscular once  heparin   Injectable 33274 Unit(s) SubCutaneous every 12 hours  ibuprofen  Tablet. 600 milliGRAM(s) Oral every 6 hours  influenza   Vaccine 0.5 milliLiter(s) IntraMuscular once  insulin lispro (ADMELOG) corrective regimen sliding scale   SubCutaneous three times a day before meals  insulin lispro (ADMELOG) corrective regimen sliding scale   SubCutaneous at bedtime  lactated ringers. 1000 milliLiter(s) (125 mL/Hr) IV Continuous <Continuous>  lactated ringers. 1000 milliLiter(s) (83 mL/Hr) IV Continuous <Continuous>  oxytocin Infusion 42 milliUNIT(s)/Min (42 mL/Hr) IV Continuous <Continuous>    MEDICATIONS  (PRN):  dextrose Oral Gel 15 Gram(s) Oral once PRN Blood Glucose LESS THAN 70 milliGRAM(s)/deciliter  diphenhydrAMINE 25 milliGRAM(s) Oral every 6 hours PRN Pruritus  lanolin Ointment 1 Application(s) Topical every 6 hours PRN Sore Nipples  magnesium hydroxide Suspension 30 milliLiter(s) Oral two times a day PRN Constipation  oxyCODONE    IR 5 milliGRAM(s) Oral every 3 hours PRN Moderate to Severe Pain (4-10)  oxyCODONE    IR 5 milliGRAM(s) Oral once PRN Moderate to Severe Pain (4-10)  simethicone 80 milliGRAM(s) Chew every 4 hours PRN Gas      Allergies    No Known Allergies    Intolerances      Review of Systems:  Constitutional: No fever  Eyes: No blurry vision  Neuro: No headache  HEENT: No throat pain  Cardiovascular: No chest pain  Respiratory: No SOB, no cough  GI: No abdominal pain  : No dysuria  Skin: no rash  Psych: no depression  Endocrine: as noted in HPI  Hem/lymph: no swelling      PHYSICAL EXAM:  VITALS: T(C): 36.9 (24 @ 08:15)  T(F): 98.4 (24 @ 08:15), Max: 98.7 (24 @ 22:10)  HR: 109 (24 @ 08:15) (95 - 113)  BP: 118/69 (24 @ 08:15) (89/67 - 127/75)  RR:  (15 - 49)  SpO2:  (94% - 100%)  GENERAL: Lying in bed in  NAD,   EYES: No proptosis,  HEENT:  Atraumatic, Normocephalic,   THYROID: Normal size, no palpable nodules  RESPIRATORY: Clear to auscultation bilaterally  CARDIOVASCULAR: Regular rhythm; No murmurs; no peripheral edema  GI: Soft, tender at incision site, normal bowel sounds  MUSCULOSKELETAL: normal strength  NEURO: extraocular movements intact, no tremor, normal reflexes  PSYCH: Alert and oriented x 3, normal affect, normal mood      POCT Blood Glucose.: 152 mg/dL (24 @ 11:53)  POCT Blood Glucose.: 117 mg/dL (24 @ 08:12)  POCT Blood Glucose.: 133 mg/dL (24 @ 23:03)  POCT Blood Glucose.: 163 mg/dL (11-25-24 @ 15:33)  POCT Blood Glucose.: 114 mg/dL (24 @ 08:50)  POCT Blood Glucose.: 98 mg/dL (24 @ 04:32)  POCT Blood Glucose.: 102 mg/dL (24 @ 00:23)  POCT Blood Glucose.: 89 mg/dL (24 @ 20:26)  POCT Blood Glucose.: 79 mg/dL (24 @ 16:31)  POCT Blood Glucose.: 86 mg/dL (24 @ 12:55)  POCT Blood Glucose.: 94 mg/dL (24 @ 08:42)  POCT Blood Glucose.: 93 mg/dL (24 @ 04:15)  POCT Blood Glucose.: 101 mg/dL (24 @ 00:23)  POCT Blood Glucose.: 92 mg/dL (24 @ 20:30)  POCT Blood Glucose.: 150 mg/dL (24 @ 19:22)  POCT Blood Glucose.: 139 mg/dL (24 @ 19:20)                            7.1    13.46 )-----------( 211      ( 2024 06:30 )             21.4           136  |  104  |  6[L]  ----------------------------<  108[H]  3.8   |  19[L]  |  0.60    eGFR: 129    Ca    8.1[L]          TPro  5.9[L]  /  Alb  2.9[L]  /  TBili  0.8  /  DBili  x   /  AST  19  /  ALT  11  /  AlkPhos  168[H]      Thyroid Function Tests:      A1C with Estimated Average Glucose Result: 5.5 % (24 @ 06:30)        Radiology:             
HPI: 24y  at 37w4d PMH of T2DM, gHTN, known history of sinus tachycardia (follows with Dr. Mosley) admitted to OB for IOL now s/p  . Cardiology consulted for evaluation of tachycardia i/s/o recent pregnancy and . Patient with known baseline sinus tachycardia (to the 110s), follows with Dr. Bouchra mosley.  Patient with tachycardia to 140s this admission,  most notably post  with concern for blood loss. S/p 2 u pRBC, HR now improved to 90s. Patient also endorsing significant anxiety with regards to testing/hospitalization, now improved. States when she has anxiety, she does feel palpitations. Additionally, complaining of pain at site of  with movement. Denying SOB, chest discomfort, dyspnea on exertion. Able to ambulate without anginal symptoms or SOB.     During this admission, patient noted to be febrile on , c/f endometritis s/p zosyn x1. Bilateral LE duplex studies negative for DVT. CTA negative for hematoma or active bleed. TTE technically difficult - PASP 35 c/w borderline pHTN, normal systolic function.      (2024 20:49)    PAST MEDICAL & SURGICAL HISTORY:  Gestational diabetes mellitus, class A2        SOCIAL HISTORY:  Tobacco Usage:  (  x ) never smoked   (   ) former smoker   (   ) current smoker  (     ) pack years    Tobacco Quit Date:  Substance Use (Street drugs): ( x ) never used  (  ) other:  Alcohol Usage:    Family history reviewed and otherwise non-contributory  ALLERGIES:  No Known Allergies    MEDICATIONS:  acetaminophen     Tablet .. 975 milliGRAM(s) Oral <User Schedule>  dextrose 5%. 1000 milliLiter(s) IV Continuous <Continuous>  dextrose 5%. 1000 milliLiter(s) IV Continuous <Continuous>  dextrose 50% Injectable 25 Gram(s) IV Push once  dextrose 50% Injectable 12.5 Gram(s) IV Push once  dextrose 50% Injectable 25 Gram(s) IV Push once  dextrose Oral Gel 15 Gram(s) Oral once PRN  diphenhydrAMINE 25 milliGRAM(s) Oral every 6 hours PRN  diphtheria/tetanus/pertussis (acellular) Vaccine (Adacel) 0.5 milliLiter(s) IntraMuscular once  glucagon  Injectable 1 milliGRAM(s) IntraMuscular once  heparin   Injectable 11220 Unit(s) SubCutaneous every 12 hours  ibuprofen  Tablet. 600 milliGRAM(s) Oral every 6 hours  influenza   Vaccine 0.5 milliLiter(s) IntraMuscular once  insulin lispro (ADMELOG) corrective regimen sliding scale   SubCutaneous three times a day before meals  insulin lispro (ADMELOG) corrective regimen sliding scale   SubCutaneous at bedtime  lactated ringers. 1000 milliLiter(s) IV Continuous <Continuous>  lactated ringers. 1000 milliLiter(s) IV Continuous <Continuous>  lanolin Ointment 1 Application(s) Topical every 6 hours PRN  magnesium hydroxide Suspension 30 milliLiter(s) Oral two times a day PRN  metFORMIN 500 milliGRAM(s) Oral daily  oxyCODONE    IR 5 milliGRAM(s) Oral every 3 hours PRN  oxyCODONE    IR 5 milliGRAM(s) Oral once PRN  oxytocin Infusion 42 milliUNIT(s)/Min IV Continuous <Continuous>  simethicone 80 milliGRAM(s) Chew every 4 hours PRN    REVIEW OF SYSTEMS:  CONSTITUTIONAL: fever overnight  EYES: No eye pain, visual disturbances, or discharge  ENMT:  No difficulty hearing, tinnitus, vertigo; No sinus or throat pain  NECK: No neck pain or neck stiffness  RESPIRATORY: No cough, wheezing, chills or hemoptysis; No shortness of breath  CARDIOVASCULAR: No chest pain, palpitations, dizziness, or leg swelling  GASTROINTESTINAL: Abdominal pain i/s/o c section, No nausea, vomiting, or hematemesis; No diarrhea or constipation  GENITOURINARY: No dysuria, frequency, hematuria, or incontinence  NEUROLOGICAL: No headaches, memory loss, loss of strength, numbness, or tremors  SKIN: No itching, burning, rashes, or lesions   ENDOCRINE: No heat or cold intolerance; No hair loss  MUSCULOSKELETAL: No joint pain or swelling; No muscle, back, or extremity pain  PSYCHIATRIC: Anxiety regarding hospital testing  HEME/LYMPH: No easy bruising or bleeding  ALLERY AND IMMUNOLOGIC: No hives or eczema    All other ROS reviewed and negative except as otherwise stated    Vital Signs Last 24 Hrs  T(F): 98 (2024 11:25), Max: 99.9 (2024 16:55)  HR: 98 (2024 11:25) (98 - 133)  BP: 135/80 (2024 11:25) (122/67 - 139/102)  RR: 16 (2024 11:25) (16 - 21)  SpO2: 98% (2024 11:25) (98% - 100%)  I&O's Summary    2024 07:01  -  2024 07:00  --------------------------------------------------------  IN: 300 mL / OUT: 800 mL / NET: -500 mL      PHYSICAL EXAM:  GENERAL: NAD, well-groomed, well-developed  HEAD:  Atraumatic, Normocephalic  EYES: EOMI, PERRLA, conjunctiva and sclera clear  ENMT: Moist mucous membranes, Good dentition  NECK: Supple, No JVD  CHEST/LUNG: Clear to auscultation bilaterally; No rales, rhonchi, wheezing, or rubs  HEART:tachycardic with regular rhythm; S1/S2, No murmurs, rubs, or gallops  ABDOMEN: Soft, mildly tender, Nondistended; Bowel sounds present  VASCULAR: Normal pulses, Normal capillary refill  EXTREMITIES:  2+ Peripheral Pulses, No cyanosis, No edema  LYMPH: No lymphadenopathy noted  SKIN: Warm, Intact  PSYCH: Normal mood and affect  NERVOUS SYSTEM:  A/O x3, No focal deficits    LABS:                        8.3    10.94 )-----------( 204      ( 2024 11:25 )             24.7     11-27    141  |  106  |  9   ----------------------------<  153  3.7   |  20  |  0.50    Ca    8.7      2024 01:28    TPro  6.2  /  Alb  3.1  /  TBili  0.6  /  DBili  x   /  AST  17  /  ALT  11  /  AlkPhos  143  27      PT/INR - ( 2024 15:34 )   PT: 12.5 sec;   INR: 1.08 ratio         PTT - ( 2024 15:34 )  PTT:29.6 sec  Lactate, Blood: 1.3 mmol/L ( @ 15:34)        TSH 4.07   TSH with FT4 reflex --  Total T3 --      06:30 - VBG - pH: 7.35  | pCO2: 40    | pO2: 67    | Lactate: 2.0            POCT Blood Glucose.: 110 mg/dL (2024 12:11)  POCT Blood Glucose.: 105 mg/dL (2024 08:26)  POCT Blood Glucose.: 156 mg/dL (2024 22:02)      Urinalysis Basic - ( 2024 01:28 )    Color: x / Appearance: x / SG: x / pH: x  Gluc: 153 mg/dL / Ketone: x  / Bili: x / Urobili: x   Blood: x / Protein: x / Nitrite: x   Leuk Esterase: x / RBC: x / WBC x   Sq Epi: x / Non Sq Epi: x / Bacteria: x            RADIOLOGY & ADDITIONAL TESTS:  < from: TTE W or WO Ultrasound Enhancing Agent (11.27.24 @ 09:07) >     CONCLUSIONS:      1. Technically difficult image quality.   2. The left ventricular cavity is normal in size. Left ventricular wall thickness is normal. Left ventricular endocardium is not well visualized; however, the left ventricular systolic function appears grossly normal. Analysis of left ventricular diastolic function and filling pressure is made challenging by the presence of merged E and A wave.   3. The right ventricle is not well visualized.   4. Structurally normal mitral valve with normal leaflet excursion. There is mild mitral regurgitation.    < end of copied text >      Care Discussed with Consultants/Other Providers:

## 2024-11-28 ENCOUNTER — TRANSCRIPTION ENCOUNTER (OUTPATIENT)
Age: 24
End: 2024-11-28

## 2024-11-28 VITALS
SYSTOLIC BLOOD PRESSURE: 118 MMHG | HEART RATE: 98 BPM | OXYGEN SATURATION: 100 % | RESPIRATION RATE: 16 BRPM | DIASTOLIC BLOOD PRESSURE: 69 MMHG | TEMPERATURE: 98 F

## 2024-11-28 LAB
GLUCOSE BLDC GLUCOMTR-MCNC: 104 MG/DL — HIGH (ref 70–99)
GLUCOSE BLDC GLUCOMTR-MCNC: 104 MG/DL — HIGH (ref 70–99)

## 2024-11-28 RX ORDER — IBUPROFEN 200 MG
1 TABLET ORAL
Qty: 0 | Refills: 0 | DISCHARGE
Start: 2024-11-28

## 2024-11-28 RX ORDER — ACETAMINOPHEN 500MG 500 MG/1
3 TABLET, COATED ORAL
Qty: 0 | Refills: 0 | DISCHARGE
Start: 2024-11-28

## 2024-11-28 RX ORDER — LANOLIN 72 %
1 OINTMENT (GRAM) TOPICAL
Qty: 0 | Refills: 0 | DISCHARGE
Start: 2024-11-28

## 2024-11-28 RX ORDER — INSULIN LISPRO 100/ML
92 VIAL (ML) SUBCUTANEOUS
Refills: 0 | DISCHARGE

## 2024-11-28 RX ORDER — INSULIN ASPART 100 [IU]/ML
72 INJECTION, SOLUTION INTRAVENOUS; SUBCUTANEOUS
Refills: 0 | DISCHARGE

## 2024-11-28 RX ORDER — INSULIN ASPART 100 [IU]/ML
64 INJECTION, SOLUTION INTRAVENOUS; SUBCUTANEOUS
Refills: 0 | DISCHARGE

## 2024-11-28 RX ADMIN — ACETAMINOPHEN 500MG 975 MILLIGRAM(S): 500 TABLET, COATED ORAL at 09:27

## 2024-11-28 RX ADMIN — ACETAMINOPHEN 500MG 975 MILLIGRAM(S): 500 TABLET, COATED ORAL at 00:02

## 2024-11-28 NOTE — PROGRESS NOTE ADULT - SUBJECTIVE AND OBJECTIVE BOX
OB Progress Note: LTCS, POD#2    S: 25yo POD#3 s/p pLTCS course c/b tachycardia . Pain is well controlled. She is tolerating a regular diet and passing flatus. She is voiding spontaneously, and ambulating without difficulty. Denies CP/SOB. Denies lightheadedness/dizziness. Denies N/V.    O:  Vitals:  Vital Signs Last 24 Hrs  T(C): 36.8 (28 Nov 2024 05:33), Max: 37.1 (27 Nov 2024 22:41)  T(F): 98.3 (28 Nov 2024 05:33), Max: 98.8 (27 Nov 2024 22:41)  HR: 92 (28 Nov 2024 05:33) (92 - 100)  BP: 116/69 (28 Nov 2024 05:33) (116/69 - 138/80)  BP(mean): --  RR: 17 (28 Nov 2024 05:33) (15 - 17)  SpO2: 99% (28 Nov 2024 05:33) (98% - 100%)    Parameters below as of 28 Nov 2024 05:33  Patient On (Oxygen Delivery Method): room air        MEDICATIONS  (STANDING):  acetaminophen     Tablet .. 975 milliGRAM(s) Oral <User Schedule>  dextrose 5%. 1000 milliLiter(s) (100 mL/Hr) IV Continuous <Continuous>  dextrose 5%. 1000 milliLiter(s) (50 mL/Hr) IV Continuous <Continuous>  dextrose 50% Injectable 25 Gram(s) IV Push once  dextrose 50% Injectable 12.5 Gram(s) IV Push once  dextrose 50% Injectable 25 Gram(s) IV Push once  diphtheria/tetanus/pertussis (acellular) Vaccine (Adacel) 0.5 milliLiter(s) IntraMuscular once  glucagon  Injectable 1 milliGRAM(s) IntraMuscular once  heparin   Injectable 45015 Unit(s) SubCutaneous every 12 hours  ibuprofen  Tablet. 600 milliGRAM(s) Oral every 6 hours  influenza   Vaccine 0.5 milliLiter(s) IntraMuscular once  insulin lispro (ADMELOG) corrective regimen sliding scale   SubCutaneous three times a day before meals  insulin lispro (ADMELOG) corrective regimen sliding scale   SubCutaneous at bedtime  lactated ringers. 1000 milliLiter(s) (125 mL/Hr) IV Continuous <Continuous>  lactated ringers. 1000 milliLiter(s) (83 mL/Hr) IV Continuous <Continuous>  metFORMIN 500 milliGRAM(s) Oral daily  oxytocin Infusion 42 milliUNIT(s)/Min (42 mL/Hr) IV Continuous <Continuous>      MEDICATIONS  (PRN):  dextrose Oral Gel 15 Gram(s) Oral once PRN Blood Glucose LESS THAN 70 milliGRAM(s)/deciliter  diphenhydrAMINE 25 milliGRAM(s) Oral every 6 hours PRN Pruritus  lanolin Ointment 1 Application(s) Topical every 6 hours PRN Sore Nipples  magnesium hydroxide Suspension 30 milliLiter(s) Oral two times a day PRN Constipation  oxyCODONE    IR 5 milliGRAM(s) Oral every 3 hours PRN Moderate to Severe Pain (4-10)  oxyCODONE    IR 5 milliGRAM(s) Oral once PRN Moderate to Severe Pain (4-10)  simethicone 80 milliGRAM(s) Chew every 4 hours PRN Gas      Labs:  Blood type: O Positive  Rubella IgG: RPR: Negative                          8.3[L]   10.94[H] >-----------< 204    ( 11-27 @ 11:25 )             24.7[L]                        8.0[L]   11.33[H] >-----------< 217    ( 11-27 @ 05:45 )             24.3[L]                        8.3[L]   11.45[H] >-----------< 208    ( 11-27 @ 01:28 )             25.0[L]                        7.1[L]   13.46[H] >-----------< 211    ( 11-26 @ 06:30 )             21.4[L]                        8.2[L]   15.39[H] >-----------< 209    ( 11-25 @ 20:10 )             24.6[L]                        8.3[L]   14.27[H] >-----------< 189    ( 11-25 @ 15:34 )             25.6[L]                        10.1[L]   10.38 >-----------< 191    ( 11-25 @ 11:13 )             30.9[L]    11-27-24 @ 01:28      141  |  106  |  9   ----------------------------<  153[H]  3.7   |  20[L]  |  0.50    11-26-24 @ 06:30      136  |  104  |  6[L]  ----------------------------<  108[H]  3.8   |  19[L]  |  0.60    11-25-24 @ 20:10      137  |  104  |  4[L]  ----------------------------<  186[H]  3.8   |  19[L]  |  0.47[L]    11-25-24 @ 15:34      138  |  106  |  4[L]  ----------------------------<  156[H]  3.3[L]   |  17[L]  |  0.45[L]        Ca    8.7      27 Nov 2024 01:28  Ca    8.1[L]      26 Nov 2024 06:30  Ca    8.4      25 Nov 2024 20:10  Ca    8.2[L]      25 Nov 2024 15:34    TPro  6.2  /  Alb  3.1[L]  /  TBili  0.6  /  DBili  x   /  AST  17  /  ALT  11  /  AlkPhos  143[H]  11-27-24 @ 01:28  TPro  5.9[L]  /  Alb  2.9[L]  /  TBili  0.8  /  DBili  x   /  AST  19  /  ALT  11  /  AlkPhos  168[H]  11-25-24 @ 20:10  TPro  5.4[L]  /  Alb  2.8[L]  /  TBili  0.8  /  DBili  x   /  AST  19  /  ALT  11  /  AlkPhos  169[H]  11-25-24 @ 15:34          PE:  General: NAD  Abdomen: Soft, appropriately tender, incision c/d/i.  Extremities: No erythema, no pitting edema   OB Progress Note: LTCS, POD#3    S: 23yo POD#3 s/p pLTCS course c/b tachycardia . Pain is well controlled. She is tolerating a regular diet and passing flatus. She is voiding spontaneously, and ambulating without difficulty. Denies CP/SOB. Denies lightheadedness/dizziness. Denies N/V.    O:  Vitals:  Vital Signs Last 24 Hrs  T(C): 36.8 (28 Nov 2024 05:33), Max: 37.1 (27 Nov 2024 22:41)  T(F): 98.3 (28 Nov 2024 05:33), Max: 98.8 (27 Nov 2024 22:41)  HR: 92 (28 Nov 2024 05:33) (92 - 100)  BP: 116/69 (28 Nov 2024 05:33) (116/69 - 138/80)  BP(mean): --  RR: 17 (28 Nov 2024 05:33) (15 - 17)  SpO2: 99% (28 Nov 2024 05:33) (98% - 100%)    Parameters below as of 28 Nov 2024 05:33  Patient On (Oxygen Delivery Method): room air        MEDICATIONS  (STANDING):  acetaminophen     Tablet .. 975 milliGRAM(s) Oral <User Schedule>  dextrose 5%. 1000 milliLiter(s) (100 mL/Hr) IV Continuous <Continuous>  dextrose 5%. 1000 milliLiter(s) (50 mL/Hr) IV Continuous <Continuous>  dextrose 50% Injectable 25 Gram(s) IV Push once  dextrose 50% Injectable 12.5 Gram(s) IV Push once  dextrose 50% Injectable 25 Gram(s) IV Push once  diphtheria/tetanus/pertussis (acellular) Vaccine (Adacel) 0.5 milliLiter(s) IntraMuscular once  glucagon  Injectable 1 milliGRAM(s) IntraMuscular once  heparin   Injectable 05388 Unit(s) SubCutaneous every 12 hours  ibuprofen  Tablet. 600 milliGRAM(s) Oral every 6 hours  influenza   Vaccine 0.5 milliLiter(s) IntraMuscular once  insulin lispro (ADMELOG) corrective regimen sliding scale   SubCutaneous three times a day before meals  insulin lispro (ADMELOG) corrective regimen sliding scale   SubCutaneous at bedtime  lactated ringers. 1000 milliLiter(s) (125 mL/Hr) IV Continuous <Continuous>  lactated ringers. 1000 milliLiter(s) (83 mL/Hr) IV Continuous <Continuous>  metFORMIN 500 milliGRAM(s) Oral daily  oxytocin Infusion 42 milliUNIT(s)/Min (42 mL/Hr) IV Continuous <Continuous>      MEDICATIONS  (PRN):  dextrose Oral Gel 15 Gram(s) Oral once PRN Blood Glucose LESS THAN 70 milliGRAM(s)/deciliter  diphenhydrAMINE 25 milliGRAM(s) Oral every 6 hours PRN Pruritus  lanolin Ointment 1 Application(s) Topical every 6 hours PRN Sore Nipples  magnesium hydroxide Suspension 30 milliLiter(s) Oral two times a day PRN Constipation  oxyCODONE    IR 5 milliGRAM(s) Oral every 3 hours PRN Moderate to Severe Pain (4-10)  oxyCODONE    IR 5 milliGRAM(s) Oral once PRN Moderate to Severe Pain (4-10)  simethicone 80 milliGRAM(s) Chew every 4 hours PRN Gas      Labs:  Blood type: O Positive  Rubella IgG: RPR: Negative                          8.3[L]   10.94[H] >-----------< 204    ( 11-27 @ 11:25 )             24.7[L]                        8.0[L]   11.33[H] >-----------< 217    ( 11-27 @ 05:45 )             24.3[L]                        8.3[L]   11.45[H] >-----------< 208    ( 11-27 @ 01:28 )             25.0[L]                        7.1[L]   13.46[H] >-----------< 211    ( 11-26 @ 06:30 )             21.4[L]                        8.2[L]   15.39[H] >-----------< 209    ( 11-25 @ 20:10 )             24.6[L]                        8.3[L]   14.27[H] >-----------< 189    ( 11-25 @ 15:34 )             25.6[L]                        10.1[L]   10.38 >-----------< 191    ( 11-25 @ 11:13 )             30.9[L]    11-27-24 @ 01:28      141  |  106  |  9   ----------------------------<  153[H]  3.7   |  20[L]  |  0.50    11-26-24 @ 06:30      136  |  104  |  6[L]  ----------------------------<  108[H]  3.8   |  19[L]  |  0.60    11-25-24 @ 20:10      137  |  104  |  4[L]  ----------------------------<  186[H]  3.8   |  19[L]  |  0.47[L]    11-25-24 @ 15:34      138  |  106  |  4[L]  ----------------------------<  156[H]  3.3[L]   |  17[L]  |  0.45[L]        Ca    8.7      27 Nov 2024 01:28  Ca    8.1[L]      26 Nov 2024 06:30  Ca    8.4      25 Nov 2024 20:10  Ca    8.2[L]      25 Nov 2024 15:34    TPro  6.2  /  Alb  3.1[L]  /  TBili  0.6  /  DBili  x   /  AST  17  /  ALT  11  /  AlkPhos  143[H]  11-27-24 @ 01:28  TPro  5.9[L]  /  Alb  2.9[L]  /  TBili  0.8  /  DBili  x   /  AST  19  /  ALT  11  /  AlkPhos  168[H]  11-25-24 @ 20:10  TPro  5.4[L]  /  Alb  2.8[L]  /  TBili  0.8  /  DBili  x   /  AST  19  /  ALT  11  /  AlkPhos  169[H]  11-25-24 @ 15:34          PE:  General: NAD  Abdomen: Soft, appropriately tender, incision c/d/i.  Extremities: No erythema, no pitting edema

## 2024-11-28 NOTE — DISCHARGE NOTE OB - FINANCIAL ASSISTANCE
[facility] provides services at a reduced cost to those who are determined to be eligible through [facility]’s financial assistance program. Information regarding [facility]’s financial assistance program can be found by going to [email or web address] or by calling [phone].

## 2024-11-28 NOTE — DISCHARGE NOTE OB - PATIENT PORTAL LINK FT
You can access the FollowMyHealth Patient Portal offered by [facility] by registering at the following website: [email or web address]. By joining [employer]’s FollowMyHealth portal, you will also be able to view your health information using other applications (apps) compatible with our system.

## 2024-11-28 NOTE — PROGRESS NOTE ADULT - ATTENDING COMMENTS
Pt cleared for dc home today.  Rx metformin 500mg QD sent. Pt aware to make an appointment in office early next week for BP check.  BP parameters d/w pt.

## 2024-11-28 NOTE — DISCHARGE NOTE OB - CARE PROVIDER_API CALL
Della Bishop  Obstetrics and Gynecology  [address] [place] [id number]  Phone: [phone]  Fax: [phone]  Follow Up Time:

## 2024-11-28 NOTE — DISCHARGE NOTE OB - MEDICATION SUMMARY - MEDICATIONS TO TAKE
I will START or STAY ON the medications listed below when I get home from the hospital:    PNV  -- Indication: For home    ibuprofen 600 mg oral tablet  -- 1 tab(s) by mouth every 6 hours  -- Indication: For pain    acetaminophen 325 mg oral tablet  -- 3 tab(s) by mouth every 8 hours  -- Indication: For pain    metFORMIN 500 mg oral tablet  -- 1 tab(s) by mouth once a day  -- Indication: For diabetes    lanolin topical ointment  -- 1 Apply on skin to affected area every 6 hours As needed Sore Nipples  -- Indication: For Sore nipples

## 2024-11-28 NOTE — PROGRESS NOTE ADULT - ASSESSMENT
23yo POD#3 s/p LTCS.  Patient is stable and doing well post-operatively.      #Tachycardia  -Patient with Tachypnea on POD 0 concern for DKA with reassuring workup-p  -Pt continued to have sinus tachycardia on telemetry monitoring  -Pt with history of tachycardia  -EKG with sinus tachycardia  -CTA Negative for PE with negative dopplers  - CXR wnl  -Pt on telemetry with tachycardia ranging up to ~140/150s at times  -TTE with mild regurgitation  -Pt seen by cardiology with without recommendation for further intervention from consulting team  -Pt is sp 2 units pRBCs with improving tachycardia (HR 90s)    #gHTN  -cw BP monitoring, BP: 116/69 (11-28-24 @ 05:33) (116/69 - 138/80)  -HELLP wnl, P/C: 0.1  -Pt not on medication  -No PEC symptoms    #Endometritis   -T38.2 11/25a  -S/p Zosyn  -Remains afebrile    #Postop from LTCS  - Continue regular diet.  - Increase ambulation.  - Continue motrin, tylenol, oxycodone PRN for pain control.     Marely Trejo, PGY-1

## 2024-11-28 NOTE — DISCHARGE NOTE OB - NS MD DC FALL RISK RISK
For information on Fall & Injury Prevention, visit: [email or web address] OR  [email or web address] OR  https://www.cdc.gov/steadi/patient.html

## 2024-11-29 ENCOUNTER — TRANSCRIPTION ENCOUNTER (OUTPATIENT)
Age: 24
End: 2024-11-29

## 2024-11-29 ENCOUNTER — NON-APPOINTMENT (OUTPATIENT)
Age: 24
End: 2024-11-29

## 2024-11-29 ENCOUNTER — APPOINTMENT (OUTPATIENT)
Age: 24
End: 2024-11-29

## 2024-11-29 RX ORDER — METFORMIN HYDROCHLORIDE 500 MG/1
500 TABLET, COATED ORAL DAILY
Refills: 0 | Status: ACTIVE | COMMUNITY
Start: 2024-11-29

## 2024-11-29 RX ORDER — NORETHINDRONE ACETATE AND ETHINYL ESTRADIOL AND FERROUS FUMARATE 1MG-20(21)
1-20 KIT ORAL
Qty: 28 | Refills: 0 | Status: DISCONTINUED | COMMUNITY
Start: 2024-05-06 | End: 2024-11-29

## 2024-11-29 RX ORDER — CHLORHEXIDINE GLUCONATE 4 %
325 (65 FE) LIQUID (ML) TOPICAL
Qty: 90 | Refills: 0 | Status: ACTIVE | COMMUNITY
Start: 2024-08-15

## 2024-11-29 RX ORDER — LEVONORGESTREL AND ETHINYL ESTRADIOL 0.15-0.03
0.15-3 KIT ORAL
Qty: 84 | Refills: 0 | Status: ACTIVE | COMMUNITY
Start: 2024-07-02

## 2024-11-29 RX ORDER — ERGOCALCIFEROL 1.25 MG/1
1.25 MG CAPSULE, LIQUID FILLED ORAL
Qty: 12 | Refills: 0 | Status: ACTIVE | COMMUNITY
Start: 2024-09-17

## 2024-11-29 RX ORDER — ASPIRIN 81 MG/1
81 TABLET, CHEWABLE ORAL
Qty: 90 | Refills: 0 | Status: DISCONTINUED | COMMUNITY
Start: 2024-07-26

## 2024-12-02 ENCOUNTER — OUTPATIENT (OUTPATIENT)
Dept: INPATIENT UNIT | Facility: HOSPITAL | Age: 24
LOS: 1 days | Discharge: HOME | End: 2024-12-02

## 2024-12-02 ENCOUNTER — NON-APPOINTMENT (OUTPATIENT)
Age: 24
End: 2024-12-02

## 2024-12-02 ENCOUNTER — APPOINTMENT (OUTPATIENT)
Dept: ANTEPARTUM | Facility: CLINIC | Age: 24
End: 2024-12-02

## 2024-12-02 DIAGNOSIS — O26.899 OTHER SPECIFIED PREGNANCY RELATED CONDITIONS, UNSPECIFIED TRIMESTER: ICD-10-CM

## 2024-12-03 ENCOUNTER — APPOINTMENT (OUTPATIENT)
Age: 24
End: 2024-12-03

## 2024-12-03 VITALS — HEART RATE: 77 BPM | DIASTOLIC BLOOD PRESSURE: 78 MMHG | SYSTOLIC BLOOD PRESSURE: 138 MMHG

## 2024-12-03 LAB
ALBUMIN SERPL ELPH-MCNC: 3.6 G/DL — SIGNIFICANT CHANGE UP (ref 3.3–5)
ALP SERPL-CCNC: 126 U/L — HIGH (ref 40–120)
ALT FLD-CCNC: 26 U/L — SIGNIFICANT CHANGE UP (ref 4–33)
ANION GAP SERPL CALC-SCNC: 12 MMOL/L — SIGNIFICANT CHANGE UP (ref 7–14)
AST SERPL-CCNC: 30 U/L — SIGNIFICANT CHANGE UP (ref 4–32)
BASOPHILS # BLD AUTO: 0.04 K/UL — SIGNIFICANT CHANGE UP (ref 0–0.2)
BASOPHILS NFR BLD AUTO: 0.6 % — SIGNIFICANT CHANGE UP (ref 0–2)
BILIRUB SERPL-MCNC: 0.3 MG/DL — SIGNIFICANT CHANGE UP (ref 0.2–1.2)
BUN SERPL-MCNC: 14 MG/DL — SIGNIFICANT CHANGE UP (ref 7–23)
CALCIUM SERPL-MCNC: 8.7 MG/DL — SIGNIFICANT CHANGE UP (ref 8.4–10.5)
CHLORIDE SERPL-SCNC: 105 MMOL/L — SIGNIFICANT CHANGE UP (ref 98–107)
CO2 SERPL-SCNC: 22 MMOL/L — SIGNIFICANT CHANGE UP (ref 22–31)
CREAT SERPL-MCNC: 0.5 MG/DL — SIGNIFICANT CHANGE UP (ref 0.5–1.3)
EGFR: 135 ML/MIN/1.73M2 — SIGNIFICANT CHANGE UP
EOSINOPHIL # BLD AUTO: 0.12 K/UL — SIGNIFICANT CHANGE UP (ref 0–0.5)
EOSINOPHIL NFR BLD AUTO: 1.7 % — SIGNIFICANT CHANGE UP (ref 0–6)
GLUCOSE SERPL-MCNC: 115 MG/DL — HIGH (ref 70–99)
HCT VFR BLD CALC: 28.7 % — LOW (ref 34.5–45)
HGB BLD-MCNC: 9.3 G/DL — LOW (ref 11.5–15.5)
IANC: 4.19 K/UL — SIGNIFICANT CHANGE UP (ref 1.8–7.4)
IMM GRANULOCYTES NFR BLD AUTO: 1.8 % — HIGH (ref 0–0.9)
LDH SERPL L TO P-CCNC: 251 U/L — HIGH (ref 135–225)
LYMPHOCYTES # BLD AUTO: 2.13 K/UL — SIGNIFICANT CHANGE UP (ref 1–3.3)
LYMPHOCYTES # BLD AUTO: 30.3 % — SIGNIFICANT CHANGE UP (ref 13–44)
MCHC RBC-ENTMCNC: 28.5 PG — SIGNIFICANT CHANGE UP (ref 27–34)
MCHC RBC-ENTMCNC: 32.4 G/DL — SIGNIFICANT CHANGE UP (ref 32–36)
MCV RBC AUTO: 88 FL — SIGNIFICANT CHANGE UP (ref 80–100)
MONOCYTES # BLD AUTO: 0.43 K/UL — SIGNIFICANT CHANGE UP (ref 0–0.9)
MONOCYTES NFR BLD AUTO: 6.1 % — SIGNIFICANT CHANGE UP (ref 2–14)
NEUTROPHILS # BLD AUTO: 4.19 K/UL — SIGNIFICANT CHANGE UP (ref 1.8–7.4)
NEUTROPHILS NFR BLD AUTO: 59.5 % — SIGNIFICANT CHANGE UP (ref 43–77)
NRBC # BLD: 0 /100 WBCS — SIGNIFICANT CHANGE UP (ref 0–0)
NRBC # FLD: 0 K/UL — SIGNIFICANT CHANGE UP (ref 0–0)
PLATELET # BLD AUTO: 411 K/UL — HIGH (ref 150–400)
POTASSIUM SERPL-MCNC: 3.9 MMOL/L — SIGNIFICANT CHANGE UP (ref 3.5–5.3)
POTASSIUM SERPL-SCNC: 3.9 MMOL/L — SIGNIFICANT CHANGE UP (ref 3.5–5.3)
PROT SERPL-MCNC: 6.6 G/DL — SIGNIFICANT CHANGE UP (ref 6–8.3)
RBC # BLD: 3.26 M/UL — LOW (ref 3.8–5.2)
RBC # FLD: 14 % — SIGNIFICANT CHANGE UP (ref 10.3–14.5)
SODIUM SERPL-SCNC: 139 MMOL/L — SIGNIFICANT CHANGE UP (ref 135–145)
URATE SERPL-MCNC: 5 MG/DL — SIGNIFICANT CHANGE UP (ref 2.5–7)
WBC # BLD: 7.04 K/UL — SIGNIFICANT CHANGE UP (ref 3.8–10.5)
WBC # FLD AUTO: 7.04 K/UL — SIGNIFICANT CHANGE UP (ref 3.8–10.5)

## 2024-12-03 RX ORDER — METOCLOPRAMIDE HYDROCHLORIDE 10 MG/1
10 TABLET ORAL ONCE
Refills: 0 | Status: COMPLETED | OUTPATIENT
Start: 2024-12-03 | End: 2024-12-03

## 2024-12-03 RX ORDER — DIPHENHYDRAMINE HCL 25 MG
25 CAPSULE ORAL ONCE
Refills: 0 | Status: DISCONTINUED | OUTPATIENT
Start: 2024-12-03 | End: 2024-12-03

## 2024-12-03 RX ORDER — DIPHENHYDRAMINE HCL 25 MG
25 CAPSULE ORAL ONCE
Refills: 0 | Status: COMPLETED | OUTPATIENT
Start: 2024-12-03 | End: 2024-12-03

## 2024-12-03 RX ORDER — ACETAMINOPHEN 500MG 500 MG/1
1000 TABLET, COATED ORAL ONCE
Refills: 0 | Status: COMPLETED | OUTPATIENT
Start: 2024-12-03 | End: 2024-12-03

## 2024-12-03 RX ADMIN — ACETAMINOPHEN 500MG 400 MILLIGRAM(S): 500 TABLET, COATED ORAL at 02:12

## 2024-12-03 RX ADMIN — Medication 25 MILLIGRAM(S): at 02:15

## 2024-12-03 RX ADMIN — METOCLOPRAMIDE HYDROCHLORIDE 10 MILLIGRAM(S): 10 TABLET ORAL at 02:13

## 2024-12-03 RX ADMIN — ACETAMINOPHEN 500MG 1000 MILLIGRAM(S): 500 TABLET, COATED ORAL at 02:36

## 2024-12-03 NOTE — OB POSTPARTUM TRIAGE NOTE - NSHPLABSRESULTS_GEN_ALL_CORE
9.3    7.04  )-----------( 411      ( 03 Dec 2024 00:40 )             28.7   12-03      139  |  105  |  14  ----------------------------<  115[H]  3.9   |  22  |  0.50    Ca    8.7      03 Dec 2024 00:40    TPro  6.6  /  Alb  3.6  /  TBili  0.3  /  DBili  x   /  AST  30  /  ALT  26  /  AlkPhos  126[H]  12-03

## 2024-12-03 NOTE — OB POSTPARTUM TRIAGE NOTE - NSHPPHYSICALEXAM_GEN_ALL_CORE
abd soft obese NT NAD, c/s incision with dressing in place- as per patient not removed since admission, not showering for fear of getting wet, pt reports maria snot have an appointment for OB to evaluate c/s incision.  CV RRR  LS clear bilaterally   negative LE pedal edema , calf pain or tenderness bilaterally  /83, 133/74, 124/74, 127/74, 131/74, 138/78 abd soft obese NT NAD, c/s incision with dressing in place- as per patient not removed since admission, not showering for fear of getting wet, pt reports maria snot have an appointment for OB to evaluate c/s incision.  CV RRR  LS clear bilaterally   abd inc s/p c/s with dressing in place clean dry and intact, skin soft around, no pain or tenderness, no ecchymosis   negative LE pedal edema , calf pain or tenderness bilaterally  /83, 133/74, 124/74, 127/74, 131/74, 138/78, 138/80, 139/82, 142/85, 153/93, 146/77, 136/77, 141/81. 134/80, 141/83

## 2024-12-03 NOTE — OB POSTPARTUM TRIAGE NOTE - HISTORY OF PRESENT ILLNESS
23 yo AA obese female sent in by OB after patient called for elevated BP and headache s/p primary c/s on 11/25/24- pt IOL at 38 weeks for Typ2DM, gHTN, chorio, s/p postpartum hemorrhage 2 units PRBC, discharged home on 11/28 and directed to take BP at home, has not taken since discharged on 11/28. Pt reports had elevated BP after deliver never on medication or magnesium, today feels sleep deprived tired stressed and reports headache around 4pm 8/10 pain scale too Tylenol at 5pm took a nap 6/10 pain still has headahce 8/10 pain. denies fever chills n/v new swelling vision changes epigastric pain cp sob or cough. Lochia scant, denies incisional pain. Spoke to Dr Gonzales and told patient to come in for evaluation     PMH: DM2  PSH: c/s x 1  meds: Vit D, PNV, Metformin 500mg 2 x day

## 2024-12-03 NOTE — OB POSTPARTUM TRIAGE NOTE - NSOBPROVIDERNOTE_OBGYN_ALL_OB_FT
PEC labs, BP monitoring     Tylenol 1gm IV  Reglan 10mg IVp  Benadryl 25mg IVP    for HA 8/10 pain, sitting relaxed in position of comfort PEC labs, BP monitoring     Tylenol 1gm IV  Reglan 10mg IVp  Benadryl 25mg IVP    for HA 8/10 pain, sitting relaxed in position of comfort    0300 sleeping easily arousable  0405 sleeping easily arousable, reports ha feels better and would like to go home pain 3/10 pain scale     d/w Dr Gonzales no need to prescribe antihypertensive medication at  this time    d/w Dr Gonzales d/c home postpartum with elevated BP and complaint of headache- improved   follow up at OB office- call this morning to be seen today-tomorrow for blood pressure check and c/s incision assessment  continue taking Blood pressure at home 2 x day call OB if BP elevated, come to hospital if BP > 160/110   maternal surveillance reassuring  rest activity as tolerated  increase water intake  keep all OB/gyn appointments  return for fever chills, increase in vaginal bleeding, elevated blood pressures, headaches not improved with tylenol or any concerns  v/w discharge instructions given with verbal understanding by patient

## 2024-12-03 NOTE — OB POSTPARTUM TRIAGE NOTE - FALL HARM RISK - UNIVERSAL INTERVENTIONS
Bed in lowest position, wheels locked, appropriate side rails in place/Call bell, personal items and telephone in reach/Instruct patient to call for assistance before getting out of bed or chair/Non-slip footwear when patient is out of bed/[place] to call system/Physically safe environment - no spills, clutter or unnecessary equipment/Purposeful Proactive Rounding/Room/bathroom lighting operational, light cord in reach

## 2024-12-03 NOTE — OB POSTPARTUM TRIAGE NOTE - FALL HARM RISK - ATTEMPT OOB
I have ordered blood work not just the is thyroid test but a CBC and a CMP to check for other problems. However if she is sleeping too much as possible she has an infection and it would be a good idea for her to be seen  
No

## 2024-12-03 NOTE — OB POSTPARTUM TRIAGE NOTE - ADDITIONAL INSTRUCTIONS
d/w Dr Gonzales d/c home postpartum with elevated BP and complaint of headache- improved   follow up at OB office- call this morning to be seen today-tomorrow for blood pressure check and c/s incision assessment  continue taking Blood pressure at home 2 x day call OB if BP elevated, come to hospital if BP > 160/110   maternal surveillance reassuring  rest activity as tolerated  increase water intake  keep all OB/gyn appointments  return for fever chills, increase in vaginal bleeding, elevated blood pressures, headaches not improved with tylenol or any concerns  v/w discharge instructions given with verbal understanding by patient

## 2024-12-04 ENCOUNTER — NON-APPOINTMENT (OUTPATIENT)
Age: 24
End: 2024-12-04

## 2024-12-05 ENCOUNTER — INPATIENT (INPATIENT)
Facility: HOSPITAL | Age: 24
LOS: 0 days | Discharge: HOME | End: 2024-12-06
Attending: STUDENT IN AN ORGANIZED HEALTH CARE EDUCATION/TRAINING PROGRAM | Admitting: STUDENT IN AN ORGANIZED HEALTH CARE EDUCATION/TRAINING PROGRAM

## 2024-12-05 ENCOUNTER — APPOINTMENT (OUTPATIENT)
Dept: ANTEPARTUM | Facility: CLINIC | Age: 24
End: 2024-12-05

## 2024-12-05 VITALS
HEART RATE: 70 BPM | RESPIRATION RATE: 17 BRPM | TEMPERATURE: 98 F | DIASTOLIC BLOOD PRESSURE: 84 MMHG | SYSTOLIC BLOOD PRESSURE: 146 MMHG

## 2024-12-05 DIAGNOSIS — Z98.891 HISTORY OF UTERINE SCAR FROM PREVIOUS SURGERY: Chronic | ICD-10-CM

## 2024-12-05 DIAGNOSIS — O26.899 OTHER SPECIFIED PREGNANCY RELATED CONDITIONS, UNSPECIFIED TRIMESTER: ICD-10-CM

## 2024-12-05 LAB
ALBUMIN SERPL ELPH-MCNC: 3.9 G/DL — SIGNIFICANT CHANGE UP (ref 3.3–5)
ALP SERPL-CCNC: 218 U/L — HIGH (ref 40–120)
ALT FLD-CCNC: 50 U/L — HIGH (ref 4–33)
ANION GAP SERPL CALC-SCNC: 13 MMOL/L — SIGNIFICANT CHANGE UP (ref 7–14)
APTT BLD: 22.1 SEC — LOW (ref 24.5–35.6)
AST SERPL-CCNC: 37 U/L — HIGH (ref 4–32)
BASOPHILS # BLD AUTO: 0.06 K/UL — SIGNIFICANT CHANGE UP (ref 0–0.2)
BASOPHILS NFR BLD AUTO: 0.8 % — SIGNIFICANT CHANGE UP (ref 0–2)
BILIRUB SERPL-MCNC: 0.3 MG/DL — SIGNIFICANT CHANGE UP (ref 0.2–1.2)
BLD GP AB SCN SERPL QL: NEGATIVE — SIGNIFICANT CHANGE UP
BUN SERPL-MCNC: 9 MG/DL — SIGNIFICANT CHANGE UP (ref 7–23)
CALCIUM SERPL-MCNC: 9.2 MG/DL — SIGNIFICANT CHANGE UP (ref 8.4–10.5)
CHLORIDE SERPL-SCNC: 105 MMOL/L — SIGNIFICANT CHANGE UP (ref 98–107)
CO2 SERPL-SCNC: 23 MMOL/L — SIGNIFICANT CHANGE UP (ref 22–31)
CREAT SERPL-MCNC: 0.55 MG/DL — SIGNIFICANT CHANGE UP (ref 0.5–1.3)
EGFR: 132 ML/MIN/1.73M2 — SIGNIFICANT CHANGE UP
EOSINOPHIL # BLD AUTO: 0.11 K/UL — SIGNIFICANT CHANGE UP (ref 0–0.5)
EOSINOPHIL NFR BLD AUTO: 1.5 % — SIGNIFICANT CHANGE UP (ref 0–6)
FIBRINOGEN PPP-MCNC: 200 MG/DL — SIGNIFICANT CHANGE UP (ref 200–465)
GLUCOSE BLDC GLUCOMTR-MCNC: 99 MG/DL — SIGNIFICANT CHANGE UP (ref 70–99)
GLUCOSE SERPL-MCNC: 113 MG/DL — HIGH (ref 70–99)
HCT VFR BLD CALC: 32.5 % — LOW (ref 34.5–45)
HGB BLD-MCNC: 10.5 G/DL — LOW (ref 11.5–15.5)
IANC: 4.59 K/UL — SIGNIFICANT CHANGE UP (ref 1.8–7.4)
IMM GRANULOCYTES NFR BLD AUTO: 0.8 % — SIGNIFICANT CHANGE UP (ref 0–0.9)
INR BLD: 1 RATIO — SIGNIFICANT CHANGE UP (ref 0.85–1.16)
LDH SERPL L TO P-CCNC: 365 U/L — HIGH (ref 135–225)
LYMPHOCYTES # BLD AUTO: 2.23 K/UL — SIGNIFICANT CHANGE UP (ref 1–3.3)
LYMPHOCYTES # BLD AUTO: 30.1 % — SIGNIFICANT CHANGE UP (ref 13–44)
MCHC RBC-ENTMCNC: 28.3 PG — SIGNIFICANT CHANGE UP (ref 27–34)
MCHC RBC-ENTMCNC: 32.3 G/DL — SIGNIFICANT CHANGE UP (ref 32–36)
MCV RBC AUTO: 87.6 FL — SIGNIFICANT CHANGE UP (ref 80–100)
MONOCYTES # BLD AUTO: 0.37 K/UL — SIGNIFICANT CHANGE UP (ref 0–0.9)
MONOCYTES NFR BLD AUTO: 5 % — SIGNIFICANT CHANGE UP (ref 2–14)
NEUTROPHILS # BLD AUTO: 4.59 K/UL — SIGNIFICANT CHANGE UP (ref 1.8–7.4)
NEUTROPHILS NFR BLD AUTO: 61.8 % — SIGNIFICANT CHANGE UP (ref 43–77)
NRBC # BLD: 0 /100 WBCS — SIGNIFICANT CHANGE UP (ref 0–0)
NRBC # FLD: 0.02 K/UL — HIGH (ref 0–0)
PLATELET # BLD AUTO: 349 K/UL — SIGNIFICANT CHANGE UP (ref 150–400)
POTASSIUM SERPL-MCNC: 4.4 MMOL/L — SIGNIFICANT CHANGE UP (ref 3.5–5.3)
POTASSIUM SERPL-SCNC: 4.4 MMOL/L — SIGNIFICANT CHANGE UP (ref 3.5–5.3)
PROT SERPL-MCNC: 7.4 G/DL — SIGNIFICANT CHANGE UP (ref 6–8.3)
PROTHROM AB SERPL-ACNC: 11.6 SEC — SIGNIFICANT CHANGE UP (ref 9.9–13.4)
RBC # BLD: 3.71 M/UL — LOW (ref 3.8–5.2)
RBC # FLD: 13.8 % — SIGNIFICANT CHANGE UP (ref 10.3–14.5)
RH IG SCN BLD-IMP: POSITIVE — SIGNIFICANT CHANGE UP
SODIUM SERPL-SCNC: 141 MMOL/L — SIGNIFICANT CHANGE UP (ref 135–145)
URATE SERPL-MCNC: 6.2 MG/DL — SIGNIFICANT CHANGE UP (ref 2.5–7)
WBC # BLD: 7.42 K/UL — SIGNIFICANT CHANGE UP (ref 3.8–10.5)
WBC # FLD AUTO: 7.42 K/UL — SIGNIFICANT CHANGE UP (ref 3.8–10.5)

## 2024-12-05 RX ORDER — 0.9 % SODIUM CHLORIDE 0.9 %
1000 INTRAVENOUS SOLUTION INTRAVENOUS
Refills: 0 | Status: DISCONTINUED | OUTPATIENT
Start: 2024-12-05 | End: 2024-12-06

## 2024-12-05 RX ORDER — INFLUENZA VIRUS VACCINE 15; 15; 15; 15 UG/.5ML; UG/.5ML; UG/.5ML; UG/.5ML
0.5 SUSPENSION INTRAMUSCULAR ONCE
Refills: 0 | Status: DISCONTINUED | OUTPATIENT
Start: 2024-12-05 | End: 2024-12-06

## 2024-12-05 RX ORDER — GLUCAGON INJECTION, SOLUTION 0.5 MG/.1ML
1 INJECTION, SOLUTION SUBCUTANEOUS ONCE
Refills: 0 | Status: DISCONTINUED | OUTPATIENT
Start: 2024-12-05 | End: 2024-12-06

## 2024-12-05 RX ORDER — DIPHENHYDRAMINE HCL 25 MG
25 CAPSULE ORAL ONCE
Refills: 0 | Status: COMPLETED | OUTPATIENT
Start: 2024-12-05 | End: 2024-12-05

## 2024-12-05 NOTE — OB POSTPARTUM TRIAGE NOTE - FALL HARM RISK - UNIVERSAL INTERVENTIONS
Bed in lowest position, wheels locked, appropriate side rails in place/Call bell, personal items and telephone in reach/Instruct patient to call for assistance before getting out of bed or chair/Non-slip footwear when patient is out of bed/[place] to call system/Physically safe environment - no spills, clutter or unnecessary equipment/Purposeful Proactive Rounding/Room/bathroom lighting operational, light cord in reach

## 2024-12-05 NOTE — H&P ADULT - ASSESSMENT
23 yo AA obese female sent in by OB for evaluation post elevated liver enzymes on blood work. s/p primary c/s on 11/25/24- pt IOL at 38 weeks for Typ2DM, gHTN, chorio, s/p postpartum hemorrhage 2 units PRBC,  HELLP labs  Dr. Wasserman at bedside.  Pt to be admitted for observation  repeat Help lab in the AM   blood consent signed.   orders places  repeat HELLP in the AM.     NARESH Sanchez NP 23 yo AA obese female sent in by OB for evaluation post elevated liver enzymes on blood work. s/p primary c/s on 11/25/24- pt IOL at 38 weeks for Typ2DM, gHTN, chorio, s/p postpartum hemorrhage 2 units PRBC,  HELLP labs  Dr. Wasserman at bedside.  Pt to be admitted for observation  repeat Help lab in the AM   blood consent signed.   orders places  repeat HELLP in the AM.     NARESH Sanchez NP    ++ATTENDING ADDENDUM++  pt seen by me at bedside  discussed that per guidelines, she does not yet meet criteria to start magnesium sulfate for seizure ppx however i have significant concerns that she could be trending towards that  i explained that i believe the safest option is to monitor her overnight, have regular bp's taken and repeat the labs in the morning  the patient was counseled on need for close home bp monitoring as well as medications that were previously prescribed  i discussed holding the medication while in the hospital to ensure that no severe range bp would be masked by antihypertensive meds  rpt labs at 5am  pt aware of potential for change in my recommendations and need for magnesium sooner should she meet criteria based on sx or bp  breast pump  pain meds prn

## 2024-12-05 NOTE — OB POSTPARTUM TRIAGE NOTE - HISTORY OF PRESENT ILLNESS
25 yo AA obese female sent in by OB for evaluation post elevated liver enzymes on blood work. s/p primary c/s on 11/25/24- pt IOL at 38 weeks for Typ2DM, gHTN, chorio, s/p postpartum hemorrhage 2 units PRBC, discharged home on 11/28, Pt was seen in triage on 12/03/ and was sent home on labetalol 200mg 2 times daily and directed to take BP at home, has not taken since discharged on 11/28. Pt reports had elevated BP after deliver never on medication or magnesium, denies visual changes, headache and epigastric pain. denies fever chills, new swelling  sob or cough.      PMH: DM2  PSH: c/s x 1  meds: Vit D, PNV, Metformin 500mg 2 x day, labetalol 200mg 2x daily, did not start taking the medication yet.

## 2024-12-05 NOTE — OB POSTPARTUM TRIAGE NOTE - NSOBPROVIDERNOTE_OBGYN_ALL_OB_FT
23 yo AA obese female sent in by OB for evaluation post elevated liver enzymes on blood work. s/p primary c/s on 11/25/24- pt IOL at 38 weeks for Typ2DM, gHTN, chorio, s/p postpartum hemorrhage 2 units PRBC,  HELLP labs  Dr. Wasserman at bedside.  Pt to be admitted for observation  repeat Help lab in the AM   blood consent signed.   orders places    NARESH mckoy NP

## 2024-12-05 NOTE — OB POSTPARTUM TRIAGE NOTE - NSHPLABSRESULTS_GEN_ALL_CORE
CBC Full  -  ( 05 Dec 2024 20:06 )  WBC Count : 7.42 K/uL  RBC Count : 3.71 M/uL  Hemoglobin : 10.5 g/dL  Hematocrit : 32.5 %  Platelet Count - Automated : 349 K/uL  Mean Cell Volume : 87.6 fL  Mean Cell Hemoglobin : 28.3 pg  Mean Cell Hemoglobin Concentration : 32.3 g/dL  Auto Neutrophil # : 4.59 K/uL  Auto Lymphocyte # : 2.23 K/uL  Auto Monocyte # : 0.37 K/uL  Auto Eosinophil # : 0.11 K/uL  Auto Basophil # : 0.06 K/uL  Auto Neutrophil % : 61.8 %  Auto Lymphocyte % : 30.1 %  Auto Monocyte % : 5.0 %  Auto Eosinophil % : 1.5 %  Auto Basophil % : 0.8 %  12-05    141  |  105  |  9   ----------------------------<  113[H]  4.4   |  23  |  0.55    Ca    9.2      05 Dec 2024 20:06    TPro  7.4  /  Alb  3.9  /  TBili  0.3  /  DBili  x   /  AST  37[H]  /  ALT  50[H]  /  AlkPhos  218[H]  12-05

## 2024-12-05 NOTE — OB POSTPARTUM TRIAGE NOTE - NSHPPHYSICALEXAM_GEN_ALL_CORE
GENERAL: no weakness, no fever/chills, no weight loss/gain  EYES/ENT: No visual changes, no vertigo or throat pain  NECK: No pain or stiffness   RESPIRATORY: no cough, no wheezing, no hemoptysis, no dyspnea, no shortness of breath  CARDIOVASCULAR: no chest pain or palpitations  GASTROINTESTINAL: no n/v/d, no abdominal or epigastric pain  GENITOURINARY: no dysuria, no frequency, no nocturia, no hematuria  MUSCULOSKELETAL: no trauma, no sprain/strain, no myalgias, no arthralgias, no fracture  NEUROLOGICAL: no HA, no dizziness, no weakness, no numbness  SKIN: No itching, rashes    Vital Signs Last 24 Hrs  T(C): --37  T(F): --  HR: --77-99  BP: --136/86, 133/88, 126/74, 132/79, 138/80, 134/84, 137/83, 140/85, 129/75, 138/86, 139/86, 149/92  BP(mean): --  RR: --18  SpO2: --

## 2024-12-05 NOTE — H&P ADULT - NSHPPHYSICALEXAM_GEN_ALL_CORE
Vital Signs Last 24 Hrs  T(C): --37  T(F): --  HR: --77-99  BP: --136/86, 133/88, 126/74, 132/79, 138/80, 134/84, 137/83, 140/85, 129/75, 138/86, 139/86, 149/92  BP(mean): --  RR: --18  SpO2: --

## 2024-12-05 NOTE — H&P ADULT - HISTORY OF PRESENT ILLNESS
23 yo AA obese female sent in by OB for evaluation post elevated liver enzymes on blood work. s/p primary c/s on 11/25/24- pt IOL at 38 weeks for Typ2DM, gHTN, chorio, s/p postpartum hemorrhage 2 units PRBC, discharged home on 11/28, Pt was seen in triage on 12/03/ and was sent home on labetalol 200mg 2 times daily and directed to take BP at home, has not taken since discharged on 11/28. Pt reports had elevated BP after deliver never on medication or magnesium, denies visual changes, headache and epigastric pain. denies fever chills, new swelling  sob or cough.      PMH: DM2  PSH: c/s x 1  meds: Vit D, PNV, Metformin 500mg 2 x day, labetalol 200mg 2x daily, did not start taking the medication yet.

## 2024-12-06 ENCOUNTER — APPOINTMENT (OUTPATIENT)
Age: 24
End: 2024-12-06
Payer: COMMERCIAL

## 2024-12-06 ENCOUNTER — TRANSCRIPTION ENCOUNTER (OUTPATIENT)
Age: 24
End: 2024-12-06

## 2024-12-06 VITALS
SYSTOLIC BLOOD PRESSURE: 130 MMHG | DIASTOLIC BLOOD PRESSURE: 73 MMHG | HEART RATE: 67 BPM | RESPIRATION RATE: 15 BRPM | TEMPERATURE: 98 F

## 2024-12-06 LAB
ALBUMIN SERPL ELPH-MCNC: 3.6 G/DL — SIGNIFICANT CHANGE UP (ref 3.3–5)
ALP SERPL-CCNC: 201 U/L — HIGH (ref 40–120)
ALT FLD-CCNC: 40 U/L — HIGH (ref 4–33)
ANION GAP SERPL CALC-SCNC: 14 MMOL/L — SIGNIFICANT CHANGE UP (ref 7–14)
AST SERPL-CCNC: 20 U/L — SIGNIFICANT CHANGE UP (ref 4–32)
BASOPHILS # BLD AUTO: 0.04 K/UL — SIGNIFICANT CHANGE UP (ref 0–0.2)
BASOPHILS NFR BLD AUTO: 0.6 % — SIGNIFICANT CHANGE UP (ref 0–2)
BILIRUB SERPL-MCNC: 0.3 MG/DL — SIGNIFICANT CHANGE UP (ref 0.2–1.2)
BUN SERPL-MCNC: 9 MG/DL — SIGNIFICANT CHANGE UP (ref 7–23)
CALCIUM SERPL-MCNC: 8.7 MG/DL — SIGNIFICANT CHANGE UP (ref 8.4–10.5)
CHLORIDE SERPL-SCNC: 104 MMOL/L — SIGNIFICANT CHANGE UP (ref 98–107)
CO2 SERPL-SCNC: 23 MMOL/L — SIGNIFICANT CHANGE UP (ref 22–31)
CREAT SERPL-MCNC: 0.52 MG/DL — SIGNIFICANT CHANGE UP (ref 0.5–1.3)
EGFR: 134 ML/MIN/1.73M2 — SIGNIFICANT CHANGE UP
EOSINOPHIL # BLD AUTO: 0.11 K/UL — SIGNIFICANT CHANGE UP (ref 0–0.5)
EOSINOPHIL NFR BLD AUTO: 1.5 % — SIGNIFICANT CHANGE UP (ref 0–6)
GLUCOSE BLDC GLUCOMTR-MCNC: 96 MG/DL — SIGNIFICANT CHANGE UP (ref 70–99)
GLUCOSE SERPL-MCNC: 96 MG/DL — SIGNIFICANT CHANGE UP (ref 70–99)
HCT VFR BLD CALC: 30.5 % — LOW (ref 34.5–45)
HGB BLD-MCNC: 9.7 G/DL — LOW (ref 11.5–15.5)
IANC: 4.24 K/UL — SIGNIFICANT CHANGE UP (ref 1.8–7.4)
IMM GRANULOCYTES NFR BLD AUTO: 0.8 % — SIGNIFICANT CHANGE UP (ref 0–0.9)
LDH SERPL L TO P-CCNC: 208 U/L — SIGNIFICANT CHANGE UP (ref 135–225)
LYMPHOCYTES # BLD AUTO: 2.36 K/UL — SIGNIFICANT CHANGE UP (ref 1–3.3)
LYMPHOCYTES # BLD AUTO: 32.6 % — SIGNIFICANT CHANGE UP (ref 13–44)
MCHC RBC-ENTMCNC: 28 PG — SIGNIFICANT CHANGE UP (ref 27–34)
MCHC RBC-ENTMCNC: 31.8 G/DL — LOW (ref 32–36)
MCV RBC AUTO: 88.2 FL — SIGNIFICANT CHANGE UP (ref 80–100)
MONOCYTES # BLD AUTO: 0.43 K/UL — SIGNIFICANT CHANGE UP (ref 0–0.9)
MONOCYTES NFR BLD AUTO: 5.9 % — SIGNIFICANT CHANGE UP (ref 2–14)
NEUTROPHILS # BLD AUTO: 4.24 K/UL — SIGNIFICANT CHANGE UP (ref 1.8–7.4)
NEUTROPHILS NFR BLD AUTO: 58.6 % — SIGNIFICANT CHANGE UP (ref 43–77)
NRBC # BLD: 0 /100 WBCS — SIGNIFICANT CHANGE UP (ref 0–0)
NRBC # FLD: 0 K/UL — SIGNIFICANT CHANGE UP (ref 0–0)
PLATELET # BLD AUTO: 410 K/UL — HIGH (ref 150–400)
POTASSIUM SERPL-MCNC: 3.8 MMOL/L — SIGNIFICANT CHANGE UP (ref 3.5–5.3)
POTASSIUM SERPL-SCNC: 3.8 MMOL/L — SIGNIFICANT CHANGE UP (ref 3.5–5.3)
PROT SERPL-MCNC: 6.8 G/DL — SIGNIFICANT CHANGE UP (ref 6–8.3)
RBC # BLD: 3.46 M/UL — LOW (ref 3.8–5.2)
RBC # FLD: 13.6 % — SIGNIFICANT CHANGE UP (ref 10.3–14.5)
SODIUM SERPL-SCNC: 141 MMOL/L — SIGNIFICANT CHANGE UP (ref 135–145)
URATE SERPL-MCNC: 5.6 MG/DL — SIGNIFICANT CHANGE UP (ref 2.5–7)
WBC # BLD: 7.24 K/UL — SIGNIFICANT CHANGE UP (ref 3.8–10.5)
WBC # FLD AUTO: 7.24 K/UL — SIGNIFICANT CHANGE UP (ref 3.8–10.5)

## 2024-12-06 PROCEDURE — S9443: CPT | Mod: 95

## 2024-12-06 RX ORDER — IBUPROFEN 200 MG
600 TABLET ORAL EVERY 6 HOURS
Refills: 0 | Status: DISCONTINUED | OUTPATIENT
Start: 2024-12-06 | End: 2024-12-06

## 2024-12-06 RX ORDER — HEPARIN SODIUM,PORCINE 1000/ML
10000 VIAL (ML) INJECTION EVERY 12 HOURS
Refills: 0 | Status: DISCONTINUED | OUTPATIENT
Start: 2024-12-06 | End: 2024-12-06

## 2024-12-06 RX ADMIN — Medication 25 MILLIGRAM(S): at 00:54

## 2024-12-06 RX ADMIN — Medication 500 MILLIGRAM(S): at 00:51

## 2024-12-06 NOTE — DISCHARGE NOTE OB - FINANCIAL ASSISTANCE
[facility] provides services at a reduced cost to those who are determined to be eligible through [facility]’s financial assistance program. Information regarding [facility]’s financial assistance program can be found by going to [email or web address] or by calling [phone].

## 2024-12-06 NOTE — DISCHARGE NOTE OB - MEDICATION SUMMARY - MEDICATIONS TO TAKE
I will START or STAY ON the medications listed below when I get home from the hospital:    PNV  -- Indication: For continued need    metFORMIN 500 mg oral tablet  -- 1 tab(s) by mouth once a day  -- Indication: For diabetes    lanolin topical ointment  -- 1 Apply on skin to affected area every 6 hours As needed Sore Nipples  -- Indication: For sore nipples

## 2024-12-06 NOTE — DISCHARGE NOTE OB - HOSPITAL COURSE
PP readmit for BP monitoring  MsNeha GUTIERRES is a 24y p/w  PP readmit pod #11 s/p pLTCS failed IOL p/w elevated LFTs outpt admitted for BP monitoring. AST/ALT (30/)--> (37/50) --> (20/40). Target range BPs.

## 2024-12-06 NOTE — PROGRESS NOTE ADULT - ATTENDING COMMENTS
Patient seen and counseled  Feeling well but very sleepy.  Denies any symptoms or concerns    VS Vital Signs Last 24 Hrs  T(C): 36.7 (06 Dec 2024 07:52), Max: 36.9 (05 Dec 2024 23:19)  T(F): 98.1 (06 Dec 2024 07:52), Max: 98.4 (05 Dec 2024 23:19)  HR: 67 (06 Dec 2024 07:52) (67 - 74)  BP: 130/73 (06 Dec 2024 07:52) (121/61 - 146/84)  BP(mean): 95 (06 Dec 2024 07:52) (95 - 95)  RR: 15 (06 Dec 2024 07:52) (15 - 17)  SpO2: --    no severe range BPs noted  LFTs improving    will discharge home today  to follow up in 3-5 days   awaiting SW consult prior to discharge

## 2024-12-06 NOTE — DISCHARGE NOTE OB - CARE PROVIDERS DIRECT ADDRESSES
[email or web address]

## 2024-12-06 NOTE — PROGRESS NOTE ADULT - SUBJECTIVE AND OBJECTIVE BOX
R3 Progress Note    Patient seen and examined at bedside, no acute overnight events. No acute complaints, pain well controlled. Patient is ambulating and tolerating regular diet. Voiding spontaneously and passing flatus. Denies CP, SOB, N/V, HA, blurred vision, epigastric pain.    Vital Signs Last 24 Hours  T(C): 36.7 (12-06-24 @ 03:37), Max: 36.9 (12-05-24 @ 23:19)  HR: 74 (12-06-24 @ 03:37) (70 - 74)  BP: 121/61 (12-06-24 @ 03:37) (121/61 - 146/84)  RR: 16 (12-06-24 @ 03:37) (16 - 17)  SpO2: --    I&O's Summary      Physical Exam:  General: NAD  Abdomen: Soft, non-tender, non-distended, fundus firm  Incision: Pfannenstiel incision CDI, subcuticular suture closure  Pelvic: Lochia wnl    Labs:    Blood Type: O Positive  Antibody Screen: Negative  RPR: Negative               9.7    7.24  )-----------( 410      ( 12-06 @ 05:16 )             30.5                10.5   7.42  )-----------( 349      ( 12-05 @ 20:06 )             32.5                9.3    7.04  )-----------( 411      ( 12-03 @ 00:40 )             28.7         MEDICATIONS  (STANDING):  dextrose 5%. 1000 milliLiter(s) (100 mL/Hr) IV Continuous <Continuous>  dextrose 5%. 1000 milliLiter(s) (50 mL/Hr) IV Continuous <Continuous>  dextrose 50% Injectable 25 Gram(s) IV Push once  dextrose 50% Injectable 12.5 Gram(s) IV Push once  dextrose 50% Injectable 25 Gram(s) IV Push once  glucagon  Injectable 1 milliGRAM(s) IntraMuscular once  heparin   Injectable 26678 Unit(s) SubCutaneous every 12 hours  influenza   Vaccine 0.5 milliLiter(s) IntraMuscular once  insulin lispro (ADMELOG) corrective regimen sliding scale   SubCutaneous three times a day before meals  insulin lispro (ADMELOG) corrective regimen sliding scale   SubCutaneous at bedtime  metFORMIN 500 milliGRAM(s) Oral two times a day    MEDICATIONS  (PRN):  dextrose Oral Gel 15 Gram(s) Oral once PRN Blood Glucose LESS THAN 70 milliGRAM(s)/deciliter  ibuprofen  Tablet. 600 milliGRAM(s) Oral every 6 hours PRN Mild Pain (1 - 3)

## 2024-12-06 NOTE — DISCHARGE NOTE OB - PATIENT PORTAL LINK FT
You can access the FollowMyHealth Patient Portal offered by [facility] by registering at the following website: [email or web address]. By joining [employer]’s FollowMyHealth portal, you will also be able to view your health information using other applications (apps) compatible with our system.

## 2024-12-06 NOTE — PROGRESS NOTE ADULT - ASSESSMENT
A/P: 24y  POD#11 from pLTCS for failed IOL c/b PPH sp 2u pRBC, endometritis, and gHTN presenting with elevated outpatient LFTs and mildly elevated BPs. Pt admitted to Antepartum service for BP monitoring to r/o sPEC. Overnight BPs mildly elevated, however pt has not yet met criteria for sPEC at this time. Pt asymptomatic this AM.    #gHTN, r/o sPEC  - Overnight BPs 120-140/60-80s  - HELLP notable for mild transaminitis: AST/ALT 37/50  - f/u AM HELLP  - Holding home [id number] at this time to avoid masking sPEC  - Continue to monitor si/sx of sPEC    #Postpartum state  - Reg diet  - HSQ/SCDs for DVT ppx  - c/w ISS, Metformin for T2DM    VTimmel PGY3

## 2024-12-06 NOTE — DISCHARGE NOTE OB - ADDITIONAL INSTRUCTIONS
Follow up in 3-5 for BP check in office  COntinue taking BPs at home three times a day Follow up in 3-5 days for BP check in office  Continue taking BPs at home three times a day

## 2024-12-06 NOTE — DISCHARGE NOTE OB - CARE PLAN
1 Assessment and plan of treatment:	PP admission for gestational hypertension  Target range BPs   Principal Discharge DX:	Gestational hypertension  Assessment and plan of treatment:	PP admission for gestational hypertension  Target range BPs

## 2024-12-06 NOTE — DISCHARGE NOTE OB - CARE PROVIDER_API CALL
Vanda Mayorga  Obstetrics and Gynecology  [address] [place] [id number]  Phone: [phone]  Follow Up Time:

## 2024-12-08 LAB — SURGICAL PATHOLOGY STUDY: SIGNIFICANT CHANGE UP

## 2024-12-09 ENCOUNTER — NON-APPOINTMENT (OUTPATIENT)
Age: 24
End: 2024-12-09

## 2024-12-13 ENCOUNTER — EMERGENCY (EMERGENCY)
Facility: HOSPITAL | Age: 24
LOS: 1 days | Discharge: HOME | End: 2024-12-13
Attending: EMERGENCY MEDICINE | Admitting: EMERGENCY MEDICINE
Payer: COMMERCIAL

## 2024-12-13 VITALS
TEMPERATURE: 98 F | WEIGHT: 259.93 LBS | HEIGHT: 66 IN | OXYGEN SATURATION: 99 % | HEART RATE: 82 BPM | SYSTOLIC BLOOD PRESSURE: 146 MMHG | DIASTOLIC BLOOD PRESSURE: 87 MMHG | RESPIRATION RATE: 16 BRPM

## 2024-12-13 VITALS
TEMPERATURE: 98 F | OXYGEN SATURATION: 100 % | SYSTOLIC BLOOD PRESSURE: 133 MMHG | HEART RATE: 81 BPM | RESPIRATION RATE: 16 BRPM | DIASTOLIC BLOOD PRESSURE: 83 MMHG

## 2024-12-13 DIAGNOSIS — Z98.891 HISTORY OF UTERINE SCAR FROM PREVIOUS SURGERY: Chronic | ICD-10-CM

## 2024-12-13 LAB
ALBUMIN SERPL ELPH-MCNC: 4.5 G/DL — SIGNIFICANT CHANGE UP (ref 3.3–5)
ALP SERPL-CCNC: 175 U/L — HIGH (ref 40–120)
ALT FLD-CCNC: 21 U/L — SIGNIFICANT CHANGE UP (ref 4–33)
ANION GAP SERPL CALC-SCNC: 15 MMOL/L — HIGH (ref 7–14)
AST SERPL-CCNC: 26 U/L — SIGNIFICANT CHANGE UP (ref 4–32)
BASOPHILS # BLD AUTO: 0.04 K/UL — SIGNIFICANT CHANGE UP (ref 0–0.2)
BASOPHILS NFR BLD AUTO: 0.5 % — SIGNIFICANT CHANGE UP (ref 0–2)
BILIRUB SERPL-MCNC: 0.8 MG/DL — SIGNIFICANT CHANGE UP (ref 0.2–1.2)
BLD GP AB SCN SERPL QL: NEGATIVE — SIGNIFICANT CHANGE UP
BLOOD GAS VENOUS COMPREHENSIVE RESULT: SIGNIFICANT CHANGE UP
BUN SERPL-MCNC: 8 MG/DL — SIGNIFICANT CHANGE UP (ref 7–23)
CALCIUM SERPL-MCNC: 9.4 MG/DL — SIGNIFICANT CHANGE UP (ref 8.4–10.5)
CHLORIDE SERPL-SCNC: 101 MMOL/L — SIGNIFICANT CHANGE UP (ref 98–107)
CO2 SERPL-SCNC: 24 MMOL/L — SIGNIFICANT CHANGE UP (ref 22–31)
CREAT SERPL-MCNC: 0.49 MG/DL — LOW (ref 0.5–1.3)
EGFR: 136 ML/MIN/1.73M2 — SIGNIFICANT CHANGE UP
EOSINOPHIL # BLD AUTO: 0.05 K/UL — SIGNIFICANT CHANGE UP (ref 0–0.5)
EOSINOPHIL NFR BLD AUTO: 0.7 % — SIGNIFICANT CHANGE UP (ref 0–6)
GLUCOSE SERPL-MCNC: 90 MG/DL — SIGNIFICANT CHANGE UP (ref 70–99)
HCT VFR BLD CALC: 34 % — LOW (ref 34.5–45)
HGB BLD-MCNC: 10.7 G/DL — LOW (ref 11.5–15.5)
IANC: 5.37 K/UL — SIGNIFICANT CHANGE UP (ref 1.8–7.4)
IMM GRANULOCYTES NFR BLD AUTO: 0.5 % — SIGNIFICANT CHANGE UP (ref 0–0.9)
LIDOCAIN IGE QN: 16 U/L — SIGNIFICANT CHANGE UP (ref 7–60)
LYMPHOCYTES # BLD AUTO: 1.67 K/UL — SIGNIFICANT CHANGE UP (ref 1–3.3)
LYMPHOCYTES # BLD AUTO: 21.9 % — SIGNIFICANT CHANGE UP (ref 13–44)
MCHC RBC-ENTMCNC: 27.2 PG — SIGNIFICANT CHANGE UP (ref 27–34)
MCHC RBC-ENTMCNC: 31.5 G/DL — LOW (ref 32–36)
MCV RBC AUTO: 86.5 FL — SIGNIFICANT CHANGE UP (ref 80–100)
MONOCYTES # BLD AUTO: 0.47 K/UL — SIGNIFICANT CHANGE UP (ref 0–0.9)
MONOCYTES NFR BLD AUTO: 6.2 % — SIGNIFICANT CHANGE UP (ref 2–14)
NEUTROPHILS # BLD AUTO: 5.37 K/UL — SIGNIFICANT CHANGE UP (ref 1.8–7.4)
NEUTROPHILS NFR BLD AUTO: 70.2 % — SIGNIFICANT CHANGE UP (ref 43–77)
NRBC # BLD: 0 /100 WBCS — SIGNIFICANT CHANGE UP (ref 0–0)
NRBC # FLD: 0 K/UL — SIGNIFICANT CHANGE UP (ref 0–0)
PLATELET # BLD AUTO: 374 K/UL — SIGNIFICANT CHANGE UP (ref 150–400)
POTASSIUM SERPL-MCNC: 4.1 MMOL/L — SIGNIFICANT CHANGE UP (ref 3.5–5.3)
POTASSIUM SERPL-SCNC: 4.1 MMOL/L — SIGNIFICANT CHANGE UP (ref 3.5–5.3)
PROT SERPL-MCNC: 7.9 G/DL — SIGNIFICANT CHANGE UP (ref 6–8.3)
RBC # BLD: 3.93 M/UL — SIGNIFICANT CHANGE UP (ref 3.8–5.2)
RBC # FLD: 12.9 % — SIGNIFICANT CHANGE UP (ref 10.3–14.5)
RH IG SCN BLD-IMP: POSITIVE — SIGNIFICANT CHANGE UP
SODIUM SERPL-SCNC: 140 MMOL/L — SIGNIFICANT CHANGE UP (ref 135–145)
WBC # BLD: 7.64 K/UL — SIGNIFICANT CHANGE UP (ref 3.8–10.5)
WBC # FLD AUTO: 7.64 K/UL — SIGNIFICANT CHANGE UP (ref 3.8–10.5)

## 2024-12-13 PROCEDURE — 99285 EMERGENCY DEPT VISIT HI MDM: CPT

## 2024-12-13 PROCEDURE — 76705 ECHO EXAM OF ABDOMEN: CPT | Mod: 26

## 2024-12-13 PROCEDURE — 74019 RADEX ABDOMEN 2 VIEWS: CPT | Mod: 26

## 2024-12-13 RX ORDER — SODIUM CHLORIDE 9 MG/ML
1000 INJECTION, SOLUTION INTRAMUSCULAR; INTRAVENOUS; SUBCUTANEOUS ONCE
Refills: 0 | Status: COMPLETED | OUTPATIENT
Start: 2024-12-13 | End: 2024-12-13

## 2024-12-13 RX ORDER — ACETAMINOPHEN 500MG 500 MG/1
1000 TABLET, COATED ORAL ONCE
Refills: 0 | Status: COMPLETED | OUTPATIENT
Start: 2024-12-13 | End: 2024-12-13

## 2024-12-13 RX ORDER — LABETALOL 100 MG/1
300 TABLET, FILM COATED ORAL ONCE
Refills: 0 | Status: COMPLETED | OUTPATIENT
Start: 2024-12-13 | End: 2024-12-13

## 2024-12-13 RX ADMIN — LABETALOL 300 MILLIGRAM(S): 100 TABLET, FILM COATED ORAL at 23:24

## 2024-12-13 RX ADMIN — SODIUM CHLORIDE 1000 MILLILITER(S): 9 INJECTION, SOLUTION INTRAMUSCULAR; INTRAVENOUS; SUBCUTANEOUS at 20:36

## 2024-12-13 RX ADMIN — ACETAMINOPHEN 500MG 400 MILLIGRAM(S): 500 TABLET, COATED ORAL at 22:43

## 2024-12-13 NOTE — ED ADULT TRIAGE NOTE - CHIEF COMPLAINT QUOTE
Pt states she feels bloated, and nauseous with one episode of vomiting. endorsing headache. Pt postpartum x 2 weeks, reports she had epidural now c/o back pain. Pt states she feels bloated, and nauseous with one episode of vomiting. endorsing headache. Pt postpartum x 2 weeks, reports she had epidural now c/o back pain. L&D called, per L&D "pt seen here about one week ago for similar symptoms and elevated Bp, ruled out for preeclampsia, to be seen in adult ER."

## 2024-12-13 NOTE — ED ADULT NURSE NOTE - CHIEF COMPLAINT QUOTE
Pt states she feels bloated, and nauseous with one episode of vomiting. endorsing headache. Pt postpartum x 2 weeks, reports she had epidural now c/o back pain. L&D called, per L&D "pt seen here about one week ago for similar symptoms and elevated Bp, ruled out for preeclampsia, to be seen in adult ER."

## 2024-12-13 NOTE — ED PROVIDER NOTE - NSFOLLOWUPINSTRUCTIONS_ED_ALL_ED_FT
You came in for bloating and nausea.  Sometimes this is secondary to just the body rearranging itself after being pregnant.  Of note you are on metformin which can also cause bloating and nausea.  You should follow-up with your doctor.  Thankfully no emergent issues were identified during your stay.  You must continue to take your labetalol as directed as your blood pressure remains high.    Of note you also have gallstones although nothing that shows infection or anything acute at this time.  You should follow-up with the surgeon to evaluate that further.  If you have find that you have severe right upper quadrant pain and fever he should come to the back to the emergency department    You are being discharged from the Emergency Department after evaluation of your presenting problem.  You were found not to have an emergency that requires hospitalization or surgery, but this does not mean you do not have a health concern.  You should follow up with your primary care physician and any other physicians suggested at time of discharge.  Also, if your condition worsens or changes know that the emergency department is open and available 24 hours a day/ 7 days a week and you should return to us if you have concerns. Thank you for allowing us to participate in your care.

## 2024-12-13 NOTE — ED ADULT NURSE NOTE - BEFAST EYES
I spoke with the pt she states that she is willing to try the Valium 3 times daily  She did state that if that does not work she would like to go back to her old medication  She also stated that she would be okay with taking the tramadol 1 1/2 tablets 3 times daily  Lastly she would like to stay on the 10mg of the zolpidem   Please advise, thanks No

## 2024-12-13 NOTE — ED ADULT NURSE NOTE - OBJECTIVE STATEMENT
Pt arrives to ED intake rm 3 c/o nausea, vomiting, abd and back pain since this morning. Pt states that they are 2 weeks post partum (), . Pt endorses inability to tolerate PO intake. Respirations are even and unlabored, pt denies fevers, chills, diarrhea, chest pain, SOB. 20G placed to LAC, labs drawn and sent. Medicated as per MAR

## 2024-12-13 NOTE — ED ADULT NURSE NOTE - AVIAN FLU SYMPTOMS
Patient presents for surveillance colonoscopy. Last colonoscopy was in 2020. At that time he had 5 tubular adenomas. These ranged in size from 3 to 5 mm.
No

## 2024-12-13 NOTE — CONSULT NOTE ADULT - SUBJECTIVE AND OBJECTIVE BOX
Gyn Consult Note  RIAN GUTIERRES  24y  Female [id number]    HPI:   INCOMPLETE      Name of GYN Physician:     OBHx:    GYNHx: Denies fibroids, cysts, endometriosis, STI's, abnormal pap smears   PMH:  PSH:  Meds:  All:  Soc:    accepts blood      Physical Exam (Chaperoned by ED RN):     General: sitting comfortably in bed, NAD   CV: RRR  Lungs: CTAB  Back: No CVA tenderness  Abd: Soft, non-tender, non-distended. Bowel sounds present.    :  No bleeding on pad. External labia wnl. Bimanual exam with no cervical motion tenderness, uterus wnl, adnexa non palpable b/l. Cervix closed vs. Cervix dilated cm   Speculum Exam: No active bleeding from os.  Physiologic discharge.    Ext: non-tender b/l, no edema       T(C): 36.4 (12-13-24 @ 20:53), Max: 36.9 (12-13-24 @ 18:37)  HR: 89 (12-13-24 @ 20:53) (82 - 89)  BP: 150/88 (12-13-24 @ 20:53) (146/87 - 150/88)  RR: 16 (12-13-24 @ 20:53) (16 - 16)  SpO2: 100% (12-13-24 @ 20:53) (99% - 100%)      LABS:                              10.7   7.64  )-----------( 374      ( 13 Dec 2024 20:36 )             34.0     12-13    140  |  101  |  8   ----------------------------<  90  4.1   |  24  |  0.49[L]    Ca    9.4      13 Dec 2024 20:36    TPro  7.9  /  Alb  4.5  /  TBili  0.8  /  DBili  x   /  AST  26  /  ALT  21  /  AlkPhos  175[H]  12-13    I&O's Detail      Urinalysis Basic - ( 13 Dec 2024 20:36 )    Color: x / Appearance: x / SG: x / pH: x  Gluc: 90 mg/dL / Ketone: x  / Bili: x / Urobili: x   Blood: x / Protein: x / Nitrite: x   Leuk Esterase: x / RBC: x / WBC x   Sq Epi: x / Non Sq Epi: x / Bacteria: x          RADIOLOGY & ADDITIONAL STUDIES:   Gyn Consult Note  RIAN GUTIERRES  24y  Female [id number]    HPI: Patient is a 23yo  with hx T2DM now POD#18 from pCS for failed IOL c/b PPH (s/p 2uPRBC), gHTN, endometritis s/p Zosyn, presenting to the ED with back pain and bloating.    The patient reports that last night in the middle of the night, she started feeling some middle-back pain. She feels like it's over her spine and is worse when she moves and twists. At its worst it's an 8/10. She took Motrin last night which helped a lot so she was able to go back to sleep, but then when she woke up she noticed the pain was still there. She also endorses an associated bloating, She continues to have daily bowel movements (last yesterday) and is passing gas. She has occasional nausea. She denies any chest pain, shortness of breath, fevers, chills, headache, vision changes, vaginal discharge, lower abdominal/pelvic pain, flank pain, dysuria. She is still having normal lochia. She feels like she is bonding well with the baby.    GYN consulted because the patient has mildly elevated BPs. The patient has known gestational hypertension during this pregnancy. She was seen in triage on 12/3 for elevated BPs and headache and at that time was sent home on Labetalol which she did not end up taking. She was then admitted from - with elevated BPs and mildly elevated LFTs (highest 37/50), monitored for about 24 hours, and sent home. She was never diagnosed with PEC or sPEC and never received Magnesium. She was sent home from her recent admission on Labetalol 300 TID, and did not take her 7PM dose.      Name of GYN Physician: [facility]    OBHx:   - 2024 pCS for failed IOL c/b PPH (EBL 1718 s/p hemabate and TXAx2, and 2uPRBC), endometritis (s/p Zosyn), gHTN, tachycardia (s/p normal Dopplers, CXR, and CT angio chest)  GYNHx: Denies fibroids, cysts, endometriosis, STI's, abnormal pap smears   PMH: T2DM, sinus tachycardia (follows with Dr. Crow)  PSH: denies  Meds: Metformin 500mg nightly, Labetalol 300mg TID, Fe, PNV  All: NKDA  Soc: denies T/A/D    accepts blood      Physical Exam (Chaperoned by ED RN):     General: sitting comfortably in bed, NAD   CV: clinically well perfused  Lungs: unlabored breathing  Back: No CVA tenderness  Abd: Soft, non-tender, non-distended.  : No bleeding on pad.   Ext: non-tender b/l, no edema       T(C): 36.4 (24 @ 20:53), Max: 36.9 (24 @ 18:37)  HR: 89 (24 @ 20:53) (82 - 89)  BP: 150/88 (24 @ 20:53) (146/87 - 150/88)  RR: 16 (24 @ 20:53) (16 - 16)  SpO2: 100% (24 @ 20:53) (99% - 100%)      LABS:    Blood Gas Venous - Lactate: 1.6  Lipase: 16  Lactate Dehydrogenase, Serum: 208  Uric Acid: 5.6                          10.7   7.64  )-----------( 374      ( 13 Dec 2024 20:36 )             34.0         140  |  101  |  8   ----------------------------<  90  4.1   |  24  |  0.49[L]    Ca    9.4      13 Dec 2024 20:36    TPro  7.9  /  Alb  4.5  /  TBili  0.8  /  DBili  x   /  AST  26  /  ALT  21  /  AlkPhos  175[H]      I&O's Detail      Urinalysis Basic - ( 13 Dec 2024 20:36 )    Color: x / Appearance: x / SG: x / pH: x  Gluc: 90 mg/dL / Ketone: x  / Bili: x / Urobili: x   Blood: x / Protein: x / Nitrite: x   Leuk Esterase: x / RBC: x / WBC x   Sq Epi: x / Non Sq Epi: x / Bacteria: x      RADIOLOGY & ADDITIONAL STUDIES:

## 2024-12-13 NOTE — ED PROVIDER NOTE - PATIENT PORTAL LINK FT
You can access the FollowMyHealth Patient Portal offered by [facility] by registering at the following website: [email or web address]. By joining [facility] [employer]’s FollowMyHealth portal, you will also be able to view your health information using other applications (apps) compatible with our system.

## 2024-12-13 NOTE — ED PROVIDER NOTE - WORK/EXCUSE FORM DATE
17-Dec-2024 Modified Advancement Flap Text: The defect edges were debeveled with a #15 scalpel blade.  Given the location of the defect, shape of the defect and the proximity to free margins a modified advancement flap was deemed most appropriate.  Using a sterile surgical marker, an appropriate advancement flap was drawn incorporating the defect and placing the expected incisions within the relaxed skin tension lines where possible.    The area thus outlined was incised deep to adipose tissue with a #15 scalpel blade.  The skin margins were undermined to an appropriate distance in all directions utilizing iris scissors.

## 2024-12-13 NOTE — ED PROVIDER NOTE - CLINICAL SUMMARY MEDICAL DECISION MAKING FREE TEXT BOX
Missy Pete MD attending physician this is a 24-year-old woman who delivered a baby 3 weeks ago.  She has a history of diabetes postpartum as well as hypertension which is throughout the pregnancy.  She was seen on  for concerns of preeclampsia the results of the evaluation was that she had not been taking the labetalol she was supposed to be taking during her postpartum period.  She is now taking her antihypertensives appropriately labetalol 3 times a day.  She is also taking metformin 500 mg.  He only A1c that I see in the computer is from  since .5 patient was reportedly not diabetic prior to her pregnancy.  Patient is breast-feeding    She states she is having trouble keeping food down although she was able to eat today.  She states she has been vomiting had a bowel movement that was normal yesterday      Blood pressure 146/87 respiratory rate 16 heart rate 82 temp 98 O2 sat is 99    Pt alert and can phonate well  h at/nc  perrl, conj clear, sclera anicteric,  neck supple  cor rrr pos s1s2  lungs clear to asno wheeze  abd obese soft no r/g/t well-healed  scar  ext no edema no deformities  neueo awake, lucid normal gait moves all extremities with strength  psych normal affect  vs reasonable    Patient complaining of mild GI distress.  On metformin.  Feels bloated and gassy.  Abdomen is soft.  I did not obstructive series no signs of obstruction on plain film.  Will check labs if labs okay she will be reassured and sent to follow-up with her doctor

## 2024-12-13 NOTE — ED ADULT TRIAGE NOTE - WEIGHT METHOD
comfortable appearance/cooperative
comfortable appearance/family/SO at bedside
comfortable appearance/family/SO at bedside/resting/sleeping
comfortable appearance/cooperative
stated

## 2024-12-13 NOTE — CONSULT NOTE ADULT - ASSESSMENT
A/P:     RENEE Woodall PGY2   A/P: Patient is a 23yo  with hx T2DM now POD#18 from [facility] for failed IOL c/b PPH (s/p 2uPRBC), gHTN, endometritis s/p Zosyn, presenting to the ED with back pain and bloating, found to have ongoing elevated blood pressures. She is currently on Labetalol 300md TID for her gestational hypertension, and did not take her 7PM dose. HELLP labs all within normal limits.     INCOMPLETE    L Panella PGY2   A/P: Patient is a 23yo  with hx T2DM now POD#18 from pCS for failed IOL c/b PPH (s/p 2uPRBC), gHTN, endometritis s/p Zosyn, presenting to the ED with back pain and bloating, found to have ongoing elevated blood pressures. She is currently on Labetalol 300md TID for her gestational hypertension, and did not take her 7PM dose. HELLP labs all within normal limits. Patient has no si/sxs severe preeclampsia, and is overall hemodynamically stable.    - Recommend the patient receive her 7PM dose of Labetalol 300mg  - Patient has had 4 hours of BP monitoring with no severe BPs  - Discussed with the patient continued BP monitoring at home and compliance with her medications. Patient verbalized understanding.  - No acute OBGYN intervention at this time. Primary workup of other symptoms per ED  - Patient should follow up at OBGYN office as scheduled. Return precautions reviewed.    RENEE Woodall PGY2  d/w Dr. Casillas

## 2024-12-13 NOTE — ED PROVIDER NOTE - PROGRESS NOTE DETAILS
GYN called due to elevated blood pressure 3 weeks after delivery Ultrasound reveals cholelithiasis without cholecystitis patient instructed to follow-up outpatient.  Also needs follow-up with her primary care doctor given metformin could also be causing her bloating. Report by GYN is patient can be discharged home she has ongoing hypertensive that has been addressed previously she needs to take her labetalol as discussed

## 2024-12-13 NOTE — ED PROVIDER NOTE - CARE PLAN
1 Principal Discharge DX:	Bloating   Principal Discharge DX:	Bloating  Secondary Diagnosis:	Cholelithiasis  Secondary Diagnosis:	HTN (hypertension)

## 2024-12-13 NOTE — ED PROVIDER NOTE - NSICDXPASTMEDICALHX_GEN_ALL_CORE_FT
PAST MEDICAL HISTORY:  Gestational diabetes mellitus, class A2     
You can access the FollowMyHealth Patient Portal offered by [facility] by registering at the following website: [email or web address]. By joining [employer]’s FollowMyHealth portal, you will also be able to view your health information using other applications (apps) compatible with our system.

## 2024-12-16 ENCOUNTER — NON-APPOINTMENT (OUTPATIENT)
Age: 24
End: 2024-12-16

## 2024-12-18 ENCOUNTER — TRANSCRIPTION ENCOUNTER (OUTPATIENT)
Age: 24
End: 2024-12-18

## 2024-12-19 ENCOUNTER — NON-APPOINTMENT (OUTPATIENT)
Age: 24
End: 2024-12-19

## 2024-12-19 ENCOUNTER — APPOINTMENT (OUTPATIENT)
Dept: CARDIOLOGY | Facility: CLINIC | Age: 24
End: 2024-12-19
Payer: COMMERCIAL

## 2024-12-19 VITALS
SYSTOLIC BLOOD PRESSURE: 141 MMHG | BODY MASS INDEX: 40.98 KG/M2 | TEMPERATURE: 98.2 F | DIASTOLIC BLOOD PRESSURE: 89 MMHG | OXYGEN SATURATION: 98 % | WEIGHT: 255 LBS | HEIGHT: 66 IN | HEART RATE: 72 BPM

## 2024-12-19 DIAGNOSIS — R06.09 OTHER FORMS OF DYSPNEA: ICD-10-CM

## 2024-12-19 DIAGNOSIS — O14.90 UNSPECIFIED PRE-ECLAMPSIA, UNSPECIFIED TRIMESTER: ICD-10-CM

## 2024-12-19 DIAGNOSIS — R00.0 TACHYCARDIA, UNSPECIFIED: ICD-10-CM

## 2024-12-19 PROCEDURE — G2211 COMPLEX E/M VISIT ADD ON: CPT | Mod: NC

## 2024-12-19 PROCEDURE — 99214 OFFICE O/P EST MOD 30 MIN: CPT

## 2024-12-19 PROCEDURE — 93000 ELECTROCARDIOGRAM COMPLETE: CPT

## 2024-12-19 RX ORDER — LABETALOL HYDROCHLORIDE 300 MG/1
300 TABLET, FILM COATED ORAL
Qty: 270 | Refills: 3 | Status: ACTIVE | COMMUNITY
Start: 2024-12-19

## 2024-12-23 ENCOUNTER — NON-APPOINTMENT (OUTPATIENT)
Age: 24
End: 2024-12-23

## 2024-12-24 ENCOUNTER — TRANSCRIPTION ENCOUNTER (OUTPATIENT)
Age: 24
End: 2024-12-24

## 2025-01-07 ENCOUNTER — NON-APPOINTMENT (OUTPATIENT)
Age: 25
End: 2025-01-07

## 2025-02-20 ENCOUNTER — APPOINTMENT (OUTPATIENT)
Dept: CARDIOLOGY | Facility: CLINIC | Age: 25
End: 2025-02-20

## 2025-04-29 NOTE — OB RN TRIAGE NOTE - NSLDARRIVAL_OBGYN_ALL_OB_END_DATE
DATE OF ADMISSION: 2025 11:44 AM    DATE OF DISCHARGE: 2025    ADMITTING DIAGNOSIS: Surgical site infection    HPI:/HOSPITAL COURSE: Carrie Lyn is a 34 year old  female admitted for Postop complications/ surgical site infection. She underwent bedside debridement , had wet to dry changes and was started on antibiotics that were titrated based on cultures and response.      PHYSICAL EXAM ON DISCHARGE:  Vitals:    25 1840 25 1920 25 0515 25 1201   BP: 113/80 115/79 104/72 115/77   BP Location:  RUE - Right upper extremity RUE - Right upper extremity RUE - Right upper extremity   Patient Position:  Supine Supine Sitting   Pulse: 74 83 64 89   Resp:  15 16 18   Temp: 98.2 °F (36.8 °C) 98.2 °F (36.8 °C) 97.9 °F (36.6 °C) 97.7 °F (36.5 °C)   TempSrc: Oral Oral Oral Oral   SpO2: 98% 97% 98% 97%   Weight:       Height:       LMP: 2024     Gen: NAD  Abd: Erythema 1cm below drawn line, stable, soft, no significant tenderness, no rebound or guarding   : voiding freely  Ext: SCD's in place. No tenderness to palpation    CONSULTS: Infectious Disease    COMPLICATIONS: Surgical site infection    DISCHARGE DIAGNOSIS: Surgical site infection    DISPOSITION: Discharged home in stable condition.    Current Outpatient Medications   Medication Instructions    amoxicillin-clavulanate (AUGMENTIN) 875-125 MG per tablet 1 tablet, Oral, 2 TIMES DAILY    oxyCODONE (IMM REL) (ROXICODONE) 5 mg, Oral, EVERY 6 HOURS PRN       INSTRUCTION: Postop care, wound care    FOLLOW UP: In 1 week    Dulce Graves MD   Obstetrics & Gynecology PGY-4    
15-Nov-2024 14:07